# Patient Record
Sex: FEMALE | Race: WHITE | Employment: OTHER | URBAN - METROPOLITAN AREA
[De-identification: names, ages, dates, MRNs, and addresses within clinical notes are randomized per-mention and may not be internally consistent; named-entity substitution may affect disease eponyms.]

---

## 2021-03-03 ENCOUNTER — OFFICE VISIT (OUTPATIENT)
Dept: PULMONOLOGY | Age: 76
End: 2021-03-03
Payer: MEDICARE

## 2021-03-03 VITALS
DIASTOLIC BLOOD PRESSURE: 79 MMHG | HEART RATE: 54 BPM | HEIGHT: 60 IN | BODY MASS INDEX: 33.96 KG/M2 | TEMPERATURE: 94.1 F | WEIGHT: 173 LBS | OXYGEN SATURATION: 95 % | SYSTOLIC BLOOD PRESSURE: 125 MMHG

## 2021-03-03 DIAGNOSIS — E66.9 OBESITY (BMI 30.0-34.9): ICD-10-CM

## 2021-03-03 DIAGNOSIS — R06.02 SOB (SHORTNESS OF BREATH): Primary | ICD-10-CM

## 2021-03-03 DIAGNOSIS — G47.33 OSA (OBSTRUCTIVE SLEEP APNEA): ICD-10-CM

## 2021-03-03 DIAGNOSIS — R09.02 HYPOXEMIA: ICD-10-CM

## 2021-03-03 PROCEDURE — 99204 OFFICE O/P NEW MOD 45 MIN: CPT | Performed by: INTERNAL MEDICINE

## 2021-03-03 RX ORDER — LOSARTAN POTASSIUM 25 MG/1
25 TABLET ORAL DAILY
COMMUNITY
Start: 2021-01-26 | End: 2022-09-02

## 2021-03-03 RX ORDER — LANSOPRAZOLE 30 MG/1
30 CAPSULE, DELAYED RELEASE ORAL 2 TIMES DAILY
COMMUNITY

## 2021-03-03 RX ORDER — CARVEDILOL 6.25 MG/1
6.25 TABLET ORAL 2 TIMES DAILY WITH MEALS
COMMUNITY
Start: 2020-11-23

## 2021-03-03 RX ORDER — LEVOTHYROXINE SODIUM 0.07 MG/1
75 TABLET ORAL DAILY
COMMUNITY

## 2021-03-03 RX ORDER — ALPRAZOLAM 0.5 MG/1
TABLET ORAL
COMMUNITY
Start: 2021-02-22 | End: 2021-03-22

## 2021-03-03 RX ORDER — HYDROCODONE BITARTRATE AND ACETAMINOPHEN 5; 325 MG/1; MG/1
1 TABLET ORAL EVERY 6 HOURS PRN
COMMUNITY
Start: 2021-01-13 | End: 2021-03-13

## 2021-03-03 RX ORDER — POLYETHYLENE GLYCOL 3350 17 G/17G
POWDER, FOR SOLUTION ORAL
COMMUNITY

## 2021-03-03 RX ORDER — GABAPENTIN 300 MG/1
300 CAPSULE ORAL 3 TIMES DAILY
COMMUNITY

## 2021-03-03 SDOH — HEALTH STABILITY: MENTAL HEALTH: HOW OFTEN DO YOU HAVE A DRINK CONTAINING ALCOHOL?: NEVER

## 2021-03-03 ASSESSMENT — SLEEP AND FATIGUE QUESTIONNAIRES
HOW LIKELY ARE YOU TO NOD OFF OR FALL ASLEEP WHILE SITTING INACTIVE IN A PUBLIC PLACE: 0
HOW LIKELY ARE YOU TO NOD OFF OR FALL ASLEEP IN A CAR, WHILE STOPPED FOR A FEW MINUTES IN TRAFFIC: 0
HOW LIKELY ARE YOU TO NOD OFF OR FALL ASLEEP WHILE SITTING QUIETLY AFTER LUNCH WITHOUT ALCOHOL: 1
ESS TOTAL SCORE: 6
HOW LIKELY ARE YOU TO NOD OFF OR FALL ASLEEP WHEN YOU ARE A PASSENGER IN A CAR FOR AN HOUR WITHOUT A BREAK: 0

## 2021-03-03 NOTE — PROGRESS NOTES
MA Communication: The following orders are received by verbal communication from Michelle Blank MD    Orders include:  PFT/ 6 min walk/ COVID        Test 3/23/21 Covid on 3/20       1:30 phy. Build. UC does precert.

## 2021-03-03 NOTE — PROGRESS NOTES
Pulmonary Outpatient Note   Yohannes Mckeon MD       3/3/2021    1. SOB (shortness of breath)    2. Hypoxemia    3. HE (obstructive sleep apnea)    4. Obesity (BMI 30.0-34. 9)          ASSESSMENT/PLAN:  Shortness of breath. Possibly multifactorial from obesity, deconditioning, possibly pulmonary parenchymal disease. Recommend PFT. Hypoxemia, HE. Her saturation is normal.  I informed her that pulse oximetry is not always reliable, will obtain 6-minute walk test.  Overnight did not requirement cannot be determined without pulse oximetry, she does have a history of HE, details are not available. Obesity. It may be hard for her to lose, due to her shortness of breath  Prophylaxis. She does not take flu shots, is not sure that she has received Pneumovax. She should take the Covid vaccine. RTC after testing completed. Orders Placed This Encounter   Procedures    Full PFT Study With Bronchodilator    6 Minute Walk Test       No follow-ups on file. Chief Complaint:   New Patient (R/B Dr. Victor Manuel Daniel) and Shortness of Breath       HPI: Ric Robledo is a 68-year-old female referred for evaluation of shortness of breath, hypoxemia by her PCP Dr. Vasiliy Gregg. Yodit Schrader is a very pleasant 68-year-old , living in [place] with her son. Her  had cancer 25 years ago,  of stroke. The patient has been a lifelong non-smoker, does not drink alcohol. 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 The patient was hospitalized in November at [facility], was felt to have COVID-19 infection, was tested thrice but was negative. She says about 5 years ago she was seen by Dr. Yesica Almanza for \"blood clot in the aorta\", was told that she was critically ill. She recovered, has been followed by Dr. Bianca Reyes. The patient has a pulse oximeter at home, has noted that her saturations run low at 92 to 93%.   They tend to increase when she lies down, up to 94 to 96%.  She is using oxygen at 2.5 LPM at night, but has not been using it regularly in the daytime. The patient is not seeing a sleep physician, does not have CPAP. She was diagnosed with sleep apnea in the past.  She does have exertional shortness of breath with \"any activity\". Symptoms have been present for several months, she does not recollect having symptoms about a year ago. She was given an indigent device about 2 months ago, has never had a PFT. She denies cough or wheezing. She has gained about 10 pounds, has lost about 10 pounds about a year ago. She denies chest pain. She has had left ankle infection occasionally. She notes soreness in her left throat, had a scan done which is negative. Around [place], she had lost her voice for about a 2-week. .    From review of Care Everywhere, it appears she has hypertension, thoracic aortic aneurysm, bilateral carotid artery stenosis, hyperlipidemia, TIA. The patient reportedly was admitted for a presumed viral pneumonia, was discharged on oxygen at 3 LPM.    CT neck 3/1/2021  No abnormality    CT angiography 12/4/2020  No acute pulmonary embolism. Mild bronchial wall thickening with a few areas of peripheral mucous plugging. Consider reactive airways disease. Mild bibasilar atelectasis with otherwise clear lungs. Stable mild ectasia of the ascending aorta. Echocardiogram 12/2/2020  - Left ventricle: The cavity size is normal. Wall thickness is normal. Systolic function was normal.  The estimated ejection fraction was in the range of 55% to 60%. Wall motion was normal; there were  no regional wall motion abnormalities. Abnormal relaxation with normal filling pressures. - Aortic valve: Mild regurgitation.  - Aortic root: The aortic root is dilated. - Right ventricle: Systolic function was normal by objective interpretation. CT angiogram 10/1/2019  3.9 cm ascending thoracic aortic aneurysm.     Past Medical History:   Diagnosis Date    Constitutional:       General: She is not in acute distress. Appearance: Normal appearance. She is obese. She is not ill-appearing. HENT:      Head: Normocephalic and atraumatic. Nose: Nose normal. No congestion or rhinorrhea. Mouth/Throat:      Mouth: Mucous membranes are moist.      Pharynx: Oropharynx is clear. No oropharyngeal exudate. Comments: Class III airway  Eyes:      General: No scleral icterus. Right eye: No discharge. Left eye: No discharge. Pupils: Pupils are equal, round, and reactive to light. Comments: Glasses   Neck:      Comments: Short large neck  Cardiovascular:      Rate and Rhythm: Regular rhythm. Bradycardia present. Heart sounds: No murmur. No friction rub. No gallop. Pulmonary:      Effort: Pulmonary effort is normal. No respiratory distress. Breath sounds: Normal breath sounds. No stridor. No wheezing, rhonchi or rales. Abdominal:      Palpations: Abdomen is soft. Tenderness: There is no abdominal tenderness. There is no guarding or rebound. Comments: Fatty abdominal wall, mildly protuberant   Musculoskeletal:      Right lower leg: No edema. Left lower leg: Edema present. Lymphadenopathy:      Cervical: No cervical adenopathy. Skin:     General: Skin is warm and dry. Coloration: Skin is not jaundiced or pale. Findings: No bruising, erythema, lesion or rash. Neurological:      General: No focal deficit present. Mental Status: She is alert and oriented to person, place, and time.       Gait: Gait normal.   Psychiatric:         Mood and Affect: Mood normal.         Behavior: Behavior normal.

## 2021-03-03 NOTE — PATIENT INSTRUCTIONS
Remember to bring a list of pulmonary medications and any CPAP or BiPAP machines to your next appointment with the office. Please keep all of your future appointments scheduled by [address] office. Out of respect for other patients and providers, you may be asked to reschedule your appointment if you arrive later than your scheduled appointment time. Appointments cancelled less than 24hrs in advance will be considered a no show. Patients with three missed appointments within 1 year or four missed appointments within 2 years can be dismissed from the practice. You may receive a survey regarding the care you received during your visit. Your input is valuable to us. We encourage you to complete and return your survey. We hope you will choose us in the future for your healthcare needs. Pt instructed of all future appointment dates & times, including radiology, labs, procedures & referrals. If procedures were scheduled preparation instructions provided. Instructions on future appointments with [facility] were given.

## 2021-03-03 NOTE — LETTER
[facility] Pulmonary Critical Care and Sleep  [address] [address]  Phone: [phone]  Fax: [phone]    Dr. Karli Botello MD      March 7, 2021       Patient: Misty Yeung   MR Number: [id number]   YOB: 1945   Date of Visit: 3/3/2021       Dear Dr. Yahaira Ngo: Thank you for the request for consultation for Misty Yeung to me for the evaluation of HE, hypoxemia. Below are the relevant portions of my assessment and plan of care. If you have questions, please do not hesitate to call me. I look forward to following Jonathan Parkinson along with you.     Sincerely,        Becka Quiroga MD     providers:  Karli Botello, [address] [place] [id number]  Via Fax: [phone]

## 2021-03-07 ASSESSMENT — ENCOUNTER SYMPTOMS: SHORTNESS OF BREATH: 1

## 2021-03-21 LAB
EMPLOYED IN HEALTHCARE: NORMAL
FIRST TEST: NORMAL
HOSPITALIZED: NORMAL
ICU: NORMAL
PREGNANT ?: NORMAL
RESIDES IN CONGREGATE CARE SETTING: NORMAL
SARS-COV-2: NOT DETECTED
SYMPTOMATIC AS DEFINED BY THE CDC: NORMAL
TEST ORDERED: NORMAL
UDI: NORMAL

## 2021-03-25 ENCOUNTER — OFFICE VISIT (OUTPATIENT)
Dept: PULMONOLOGY | Age: 76
End: 2021-03-25
Payer: MEDICARE

## 2021-03-25 VITALS
HEIGHT: 60 IN | DIASTOLIC BLOOD PRESSURE: 80 MMHG | BODY MASS INDEX: 33.85 KG/M2 | TEMPERATURE: 96.5 F | WEIGHT: 172.4 LBS | OXYGEN SATURATION: 95 % | HEART RATE: 52 BPM | SYSTOLIC BLOOD PRESSURE: 117 MMHG

## 2021-03-25 DIAGNOSIS — E66.9 OBESITY (BMI 30.0-34.9): ICD-10-CM

## 2021-03-25 DIAGNOSIS — R06.02 SOB (SHORTNESS OF BREATH): Primary | ICD-10-CM

## 2021-03-25 DIAGNOSIS — G47.33 OSA (OBSTRUCTIVE SLEEP APNEA): ICD-10-CM

## 2021-03-25 DIAGNOSIS — R09.02 HYPOXEMIA: ICD-10-CM

## 2021-03-25 PROCEDURE — 99212 OFFICE O/P EST SF 10 MIN: CPT | Performed by: INTERNAL MEDICINE

## 2021-03-25 RX ORDER — METOPROLOL SUCCINATE 50 MG/1
50 TABLET, EXTENDED RELEASE ORAL DAILY
COMMUNITY

## 2021-03-25 RX ORDER — HYDROCODONE BITARTRATE AND ACETAMINOPHEN 5; 325 MG/1; MG/1
1 TABLET ORAL EVERY 6 HOURS PRN
COMMUNITY
Start: 2021-03-04 | End: 2021-04-03

## 2021-03-25 RX ORDER — ALPRAZOLAM 0.5 MG/1
TABLET ORAL
COMMUNITY
Start: 2021-02-22

## 2021-03-25 ASSESSMENT — ENCOUNTER SYMPTOMS: SHORTNESS OF BREATH: 1

## 2021-03-25 NOTE — PATIENT INSTRUCTIONS
Remember to bring a list of pulmonary medications and any CPAP or BiPAP machines to your next appointment with the office. Please keep all of your future appointments scheduled by [facility] office. Out of respect for other patients and providers, you may be asked to reschedule your appointment if you arrive later than your scheduled appointment time. Appointments cancelled less than 24hrs in advance will be considered a no show. Patients with three missed appointments within 1 year or four missed appointments within 2 years can be dismissed from the practice. Please be aware that our physicians are required to work in the Intensive Care Unit at [facility].  Your appointment may need to be rescheduled if they are designated to work during your appointment time. You may receive a survey regarding the care you received during your visit. Your input is valuable to us. We encourage you to complete and return your survey. We hope you will choose us in the future for your healthcare needs. Pt instructed of all future appointment dates & times, including radiology, labs, procedures & referrals. If procedures were scheduled preparation instructions provided. Instructions on future appointments with [facility] were given.

## 2021-03-25 NOTE — PROGRESS NOTES
Pulmonary Outpatient Note   Silas Lazo MD       3/25/2021    1. SOB (shortness of breath)    2. Hypoxemia    3. HE (obstructive sleep apnea)    4. Obesity (BMI 30.0-34. 9)          ASSESSMENT/PLAN:  Shortness of breath. Possibly multifactorial from obesity, deconditioning. PFT results were discussed with her, she was given a copy. Although FEV1/FVC ratio is decreased, she has no cough or wheezing. Reduction in ratio can occur with aging. She could use trilogy, but has not noted any benefit with it. Hypoxemia, HE. Her saturation is normal, 6-minute walk test did not show desaturation. I informed her that pulse oximetry is not always reliable. Overnight oxygen requirement requirement cannot be determined without pulse oximetry, she does have a history of HE, details are not available. I have asked her to try and obtain the sleep study possibly from [facility], she does not remember what facility she had her study. Obesity. It may be hard for her to lose, due to her shortness of breath. I have told her to make dietary modifications and begin to do short walks. 6-minute walk test clearly has not shown desaturation with ambulation  Prophylaxis. She does not take flu shots, is not sure that she has received Pneumovax. She should take the Covid vaccine. RTC 6 months, call earlier if symptomatically work. No orders of the defined types were placed in this encounter. Return in about 6 months (around 9/25/2021). Chief Complaint:   Follow-up (PFT/6MW Results)       HPI: Emil Henry is a 70-year-old female being seen in follow-up of shortness of breath, hypoxemia by her PCP Dr. Ирина Smith MD.    Diana Jain returns after completing a PFT and 6-minute walk test.  There has been no change in her medical or surgical history since her last visit. She continues to remain very anxious about her low oximetry readings.   She has had symptoms are worse in the evenings and her saturations are lower, even at rest.  She has not checked pulse oximetry while walking. She says she has cross checked with another oximeter and readings of the same. She is very anxious about discontinuing oxygen. She is on home oxygen at 2.5 LPM.  She denies any cough or wheezing, has been on Trelegy. For her pain she has been on hydrocodone, she has been using half a tablet 4 times a day. The patient does not remember when she had a sleep study, but could not tolerate the mask and therefore has remained on oxygen at 2.5 LPM.  She does not sleep well. Her home O2 was provided by [employer]. PFT 3/23/21  FVC 2.34 L, 96% predicted, FEV1 1.74 L, 85% predicted, with a ratio of 75%. Lung volumes were essentially normal.  Diffusion capacity was mildly decreased    6 MWT 3/23/21  Total distance walked and 10 feet, baseline oxygen saturation 94%, lowest saturation 93%. Previous notes reviewed and edited as necessary. aZc Epperson is a very pleasant 70-year-old , living in [facility] with her son. Her  had cancer 25 years ago,  of stroke. The patient has been a lifelong non-smoker, does not drink alcohol. 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 The patient was hospitalized in November at [facility], was felt to have COVID-19 infection, was tested thrice but was negative. She says about 5 years ago she was seen by Dr. Fidencio Bhakta for \"blood clot in the aorta\", was told that she was critically ill. She recovered, has been followed by Dr. Neda Pierce. The patient has a pulse oximeter at home, has noted that her saturations run low at 92 to 93%. They tend to increase when she lies down, up to 94 to 96%. She is using oxygen at 2.5 LPM at night, but has not been using it regularly in the daytime. The patient is not seeing a sleep physician, does not have CPAP. She was diagnosed with sleep apnea in the past.  She does have exertional shortness of breath with \"any activity\".   Symptoms have been present for several months, she does not recollect having symptoms about a year ago. She was given an indigent device about 2 months ago, has never had a PFT. She denies cough or wheezing. She has gained about 10 pounds, has lost about 10 pounds about a year ago. She denies chest pain. She has had left ankle infection occasionally. She notes soreness in her left throat, had a scan done which is negative. Around [place], she had lost her voice for about a 2-week. .    From review of Care Everywhere, it appears she has hypertension, thoracic aortic aneurysm, bilateral carotid artery stenosis, hyperlipidemia, TIA. The patient reportedly was admitted for a presumed viral pneumonia, was discharged on oxygen at 3 LPM.    CT neck 3/1/2021  No abnormality    CT angiography 12/4/2020  No acute pulmonary embolism. Mild bronchial wall thickening with a few areas of peripheral mucous plugging. Consider reactive airways disease. Mild bibasilar atelectasis with otherwise clear lungs. Stable mild ectasia of the ascending aorta. Echocardiogram 12/2/2020  - Left ventricle: The cavity size is normal. Wall thickness is normal. Systolic function was normal.  The estimated ejection fraction was in the range of 55% to 60%. Wall motion was normal; there were  no regional wall motion abnormalities. Abnormal relaxation with normal filling pressures. - Aortic valve: Mild regurgitation.  - Aortic root: The aortic root is dilated. - Right ventricle: Systolic function was normal by objective interpretation. CT angiogram 10/1/2019  3.9 cm ascending thoracic aortic aneurysm.     Past Medical History:   Diagnosis Date    Fibromyalgia syndrome     Hypertension        Past Surgical History:   Procedure Laterality Date    APPENDECTOMY      BREAST LUMPECTOMY Left     CATARACT REMOVAL N/A     HYSTERECTOMY      VAGINA SURGERY         Social History     Tobacco Use    Smoking status: Never Smoker    Smokeless tobacco: Never Used   Substance Use Topics    Alcohol use: Never     Frequency: Never       Family History   Problem Relation Age of Onset    Cancer Mother     Heart Attack Father     Breast Cancer Sister          Current Outpatient Medications:     ALPRAZolam (XANAX) 0.5 MG tablet, TAKE 1 TABLET(0.5 MG) BY MOUTH THREE TIMES DAILY AS NEEDED FOR SLEEP, Disp: , Rfl:     metoprolol succinate (TOPROL XL) 50 MG extended release tablet, Take 50 mg by mouth daily, Disp: , Rfl:     metoprolol tartrate (LOPRESSOR) 25 MG tablet, Take 25 mg by mouth nightly, Disp: , Rfl:     HYDROcodone-acetaminophen (NORCO) 5-325 MG per tablet, Take 1 tablet by mouth every 6 hours as needed. , Disp: , Rfl:     lansoprazole (PREVACID) 30 MG delayed release capsule, Take 30 tablets by mouth 2 times daily, Disp: , Rfl:     gabapentin (NEURONTIN) 300 MG capsule, Take 300 mg by mouth 3 times daily. , Disp: , Rfl:     levothyroxine (SYNTHROID) 75 MCG tablet, Take 75 mcg by mouth Daily, Disp: , Rfl:     carvedilol (COREG) 6.25 MG tablet, Take 6.25 mg by mouth 2 times daily (with meals), Disp: , Rfl:     polyethylene glycol (GLYCOLAX) 17 g packet, Take by mouth, Disp: , Rfl:     losartan (COZAAR) 25 MG tablet, Take 25 mg by mouth daily, Disp: , Rfl:     Amoxicillin-pot clavulanate, Clopidogrel bisulfate, Macrobid [nitrofurantoin], Monistat 1 [tioconazole], Tramadol, and Warfarin and related    Vitals:    03/25/21 1138   BP: 117/80   Site: Left Upper Arm   Position: Sitting   Cuff Size: Medium Adult   Pulse: 52   Temp: 96.5 °F (35.8 °C)   SpO2: 95%   Weight: 172 lb 6.4 oz (78.2 kg)   Height: 5' (1.524 m)       Review of Systems   Constitutional: Positive for unexpected weight change. Respiratory: Positive for shortness of breath. Cardiovascular: Positive for leg swelling. Psychiatric/Behavioral: Positive for sleep disturbance. All other systems reviewed and are negative. Physical Exam  Vitals signs reviewed.    Constitutional: General: She is not in acute distress. Appearance: Normal appearance. She is obese. She is not ill-appearing. Comments: Pleasant, short, overweight   HENT:      Head: Normocephalic and atraumatic. Nose: Nose normal. No congestion or rhinorrhea. Mouth/Throat:      Mouth: Mucous membranes are moist.      Pharynx: Oropharynx is clear. No oropharyngeal exudate. Comments: Class III airway  Eyes:      General: No scleral icterus. Right eye: No discharge. Left eye: No discharge. Pupils: Pupils are equal, round, and reactive to light. Comments: Glasses   Neck:      Comments: Short large neck  Cardiovascular:      Rate and Rhythm: Regular rhythm. Bradycardia present. Heart sounds: No murmur. No friction rub. No gallop. Comments: ?  Loud P2  Pulmonary:      Effort: Pulmonary effort is normal. No respiratory distress. Breath sounds: Normal breath sounds. No stridor. No wheezing, rhonchi or rales. Abdominal:      Comments: Fatty abdominal wall, mildly protuberant   Musculoskeletal:      Right lower leg: No edema. Left lower leg: Edema present. Lymphadenopathy:      Cervical: No cervical adenopathy. Skin:     General: Skin is warm and dry. Coloration: Skin is not jaundiced or pale. Findings: No bruising, erythema, lesion or rash. Neurological:      General: No focal deficit present. Mental Status: She is alert and oriented to person, place, and time.       Gait: Gait normal.   Psychiatric:         Mood and Affect: Mood normal.         Behavior: Behavior normal.

## 2021-03-25 NOTE — LETTER
[facility] Pulmonary Critical Care and Sleep  [address] [address] [place] [address]  Phone: [phone]  Fax: [phone]               3/25/21           Patient:  Francisco Eid         YOB: 1945                       Dear Riki Herrera MD            Francisco Eid was seen in a follow up visit today. Below are the relevant portions          of my assessment and plan of care. If you have questions, please do not hesitate to        call me. I look forward to following Francisco Eid along with you.            Sincerely             Kaleb Morrison MD

## 2021-03-26 ENCOUNTER — TELEPHONE (OUTPATIENT)
Dept: PULMONOLOGY | Age: 76
End: 2021-03-26

## 2021-03-26 NOTE — TELEPHONE ENCOUNTER
LUPILLO patient. She is going to contact sleep center and ask them some questions before she decides if she wants to do titration study. She will call us back early next week.
Please let patient know I reviewed her sleep study. I would recommend another trial with in lab titration if her insurance is agreeable and she is agreeable.
Please see if her sleep study was done at the [facility] sleep lab at [facility]. She does not remember the facility, I have also asked her to check with her [address] [place] [address] Pkwy about test results.
Request faxed to [facility] to release sleep study.
Sleep study scanned. Please review.
Detailed exam

## 2021-04-12 ENCOUNTER — TELEPHONE (OUTPATIENT)
Dept: PULMONOLOGY | Age: 76
End: 2021-04-12

## 2021-04-12 DIAGNOSIS — G47.33 OSA (OBSTRUCTIVE SLEEP APNEA): ICD-10-CM

## 2021-04-12 DIAGNOSIS — R09.02 HYPOXEMIA: Primary | ICD-10-CM

## 2021-04-12 DIAGNOSIS — E66.9 OBESITY (BMI 30.0-34.9): ICD-10-CM

## 2021-04-12 NOTE — TELEPHONE ENCOUNTER
Pt called wanting to know why you wanted her to have a sleep study done. She had a sleep study back in 2015 (results scanned in [employer]). Pt states she has been having a lot of trouble sleeping. She lays there to after 4 AM and cannot sleep (even with sleep medication her PCP prescribed). Her fear is that she goes to have testing and cannot sleep at all. She doesn't want to lay there all night. Please advise what patient should do at this point.

## 2021-04-12 NOTE — TELEPHONE ENCOUNTER
Told her to get overnight oximetry first to see whether she needs oxygen or not. She is reluctant to go for a sleep study, if she does not need oxygen she can possibly be placed on auto CPAP. Will await results of overnight oximetry.

## 2021-04-13 NOTE — TELEPHONE ENCOUNTER
Pt informed of Dr. Carolina Landin response and verbalized understanding. Order for [facility] faxed to [id number]. Will watch for results.

## 2021-04-22 ENCOUNTER — TELEPHONE (OUTPATIENT)
Dept: PULMONOLOGY | Age: 76
End: 2021-04-22

## 2021-04-22 DIAGNOSIS — G47.33 OSA (OBSTRUCTIVE SLEEP APNEA): Primary | ICD-10-CM

## 2021-04-22 DIAGNOSIS — R09.02 HYPOXEMIA: ICD-10-CM

## 2021-04-22 NOTE — TELEPHONE ENCOUNTER
Reviewed her pulse oximetry on room air from 4/21 through 4/22/2021. She has low oxygen saturation for 58.5 minutes, does qualify for home oxygen. 25 minutes were spent with SaO2 <88%. Please tell her to continue home oxygen with sleep.

## 2021-04-23 NOTE — TELEPHONE ENCOUNTER
Ideally she should have an in lab titration study rather than auto CPAP. I am placing the order if she agrees.

## 2021-04-26 NOTE — TELEPHONE ENCOUNTER
Pt called back and I informed her of Dr. Bharti Jacobsen response below. Pt wants to think about whether she wants to do the titration study or not. If she decides to proceed, she will call the office back.

## 2021-04-26 NOTE — TELEPHONE ENCOUNTER
Pt. Wants to know what is causing her sleep apnea and if she could have a Dream wear air fit 30 I zoom. Does not want to do another sleep study.

## 2021-04-26 NOTE — TELEPHONE ENCOUNTER
She can have whatever mask fits her well. HE caused by airway crowding and collapse during sleep, not necessarily associated with obesity. She should go to the DME to get fit tested. We can place the order accordingly.

## 2021-05-07 ENCOUNTER — TELEPHONE (OUTPATIENT)
Dept: PULMONOLOGY | Age: 76
End: 2021-05-07

## 2021-05-07 NOTE — TELEPHONE ENCOUNTER
Patient called the office stating that she had difficulty using her CPAP because she feels the pressure setting is too high. She would like to know if it can be adjusted. She is also requesting a medication to help her sleep. She has taken [place] in the past which worked but she doesn;t want to take that anymore. Her pcp gave her trazadone but it is not working. Please advise.

## 2021-05-07 NOTE — TELEPHONE ENCOUNTER
She is on auto CPAP, the lowest pressure is 5, the machine cannot go any lower. She may be intolerant of CPAP. At her age, would not prescribe a sedative.

## 2021-05-10 NOTE — TELEPHONE ENCOUNTER
SW patient. She is confused and says the pressure is just too high. I scheduled her an appointment with Dr. Segundo Pedraza on May 19 to discuss these issues and to see if anything can be done.

## 2021-08-05 ENCOUNTER — OFFICE VISIT (OUTPATIENT)
Dept: PULMONOLOGY | Age: 76
End: 2021-08-05
Payer: MEDICARE

## 2021-08-05 VITALS
HEIGHT: 60 IN | DIASTOLIC BLOOD PRESSURE: 82 MMHG | TEMPERATURE: 98.2 F | SYSTOLIC BLOOD PRESSURE: 129 MMHG | HEART RATE: 54 BPM | OXYGEN SATURATION: 95 % | WEIGHT: 171.4 LBS | BODY MASS INDEX: 33.65 KG/M2 | RESPIRATION RATE: 16 BRPM

## 2021-08-05 DIAGNOSIS — G47.33 OSA (OBSTRUCTIVE SLEEP APNEA): Primary | ICD-10-CM

## 2021-08-05 DIAGNOSIS — R06.02 SOB (SHORTNESS OF BREATH): ICD-10-CM

## 2021-08-05 DIAGNOSIS — R09.02 HYPOXEMIA: ICD-10-CM

## 2021-08-05 DIAGNOSIS — E66.9 OBESITY (BMI 30.0-34.9): ICD-10-CM

## 2021-08-05 PROCEDURE — 99212 OFFICE O/P EST SF 10 MIN: CPT | Performed by: INTERNAL MEDICINE

## 2021-08-05 RX ORDER — HYDROCODONE BITARTRATE AND ACETAMINOPHEN 5; 325 MG/1; MG/1
1 TABLET ORAL EVERY 8 HOURS PRN
COMMUNITY
Start: 2021-06-24 | End: 2021-08-21

## 2021-08-05 ASSESSMENT — SLEEP AND FATIGUE QUESTIONNAIRES
HOW LIKELY ARE YOU TO NOD OFF OR FALL ASLEEP WHILE SITTING AND READING: 0
HOW LIKELY ARE YOU TO NOD OFF OR FALL ASLEEP WHILE SITTING AND TALKING TO SOMEONE: 0
HOW LIKELY ARE YOU TO NOD OFF OR FALL ASLEEP WHILE LYING DOWN TO REST IN THE AFTERNOON WHEN CIRCUMSTANCES PERMIT: 1
HOW LIKELY ARE YOU TO NOD OFF OR FALL ASLEEP WHILE SITTING QUIETLY AFTER LUNCH WITHOUT ALCOHOL: 1
HOW LIKELY ARE YOU TO NOD OFF OR FALL ASLEEP WHILE SITTING INACTIVE IN A PUBLIC PLACE: 0
ESS TOTAL SCORE: 2
HOW LIKELY ARE YOU TO NOD OFF OR FALL ASLEEP IN A CAR, WHILE STOPPED FOR A FEW MINUTES IN TRAFFIC: 0
HOW LIKELY ARE YOU TO NOD OFF OR FALL ASLEEP WHEN YOU ARE A PASSENGER IN A CAR FOR AN HOUR WITHOUT A BREAK: 0
HOW LIKELY ARE YOU TO NOD OFF OR FALL ASLEEP WHILE WATCHING TV: 0

## 2021-08-05 ASSESSMENT — ENCOUNTER SYMPTOMS: SHORTNESS OF BREATH: 1

## 2021-08-05 NOTE — PROGRESS NOTES
Pulmonary Outpatient Note   Prabhjot Magana MD       8/5/2021    1. HE (obstructive sleep apnea)    2. Hypoxemia    3. SOB (shortness of breath)    4. Obesity (BMI 30.0-34. 9)          ASSESSMENT/PLAN:  Shortness of breath. Possibly multifactorial from obesity, deconditioning. Conservative management  Hypoxemia, HE. Her saturation is normal, 6-minute walk test did not show desaturation. Continue with oxygen at night  Obesity. It may be hard for her to lose, due to her shortness of breath. I have told her to make dietary modifications and begin to do short walks. 6-minute walk test clearly has not shown desaturation with ambulation  Prophylaxis. She does not take flu shots, is not sure that she has received Pneumovax. She should take the Covid vaccine. RTC prn      No orders of the defined types were placed in this encounter. No follow-ups on file. Chief Complaint:   Sleep Apnea (31-90 day, not tolerating the machine )       HPI: Carmen Soto is a 54-year-old female being seen in follow-up of shortness of breath, hypoxemia, EH. Her PCP is Dr. Christina Garcia MD.    Harvey Sheriff has been unable to use the CPAP, this is the fifth month she has been trying to use it. She has tried several masks. She was using oxygen with her CPAP. The patient says she would be unable to use her CPAP. She still complains of mild shortness of breath. She denies cough or wheezing. She is fixated about her oxygen saturation getting worse in the evenings, and getting worse when she is sitting up compared to the lying down position. There is no other change in her medical or surgical history since her last visit. Her medications and allergies were reviewed. CPAP compliance 5/6 through 8/3/2021  Usage 16 days, on the days she used CPAP she did not use it for 4 hours and 15 minutes on average. She is on auto CPAP 4-8 cm H2O, AHI 3.0    Previous notes reviewed and edited as necessary.   Harvey Sheriff was last seen on 3/25/2021, presents for 31 to 90-day CPAP compliance follow-up. She is on auto CPAP 4-8 cm H2O.  95th percentile pressure 6.9 cm H2O, maximum 7.1 cm H2O. Insignificant leak, AHI 3.0    CPAP compliance 5/6 through 8/3/2021  Usage 27/90 days, 30%. Usage >4 hours 16 days or 18%. Average usage on the days she used CPAP was 4 hours and 15 minutes. ONPO on room air 4/21/21 Caren Christian  Time spent with SaO2 <88% 25.2 minutes, <89% 58.5 minutes    Sleep study [facility] 9/2/2015  AHI 21.4. Central events 9.2/h, supine 105.4/h, REM 35.3/h. Low oxygen saturation 83%, time spent with SaO2 <90% 104.4 minutes    Previous notes reviewed and edited as necessary. Iraj Rodriguez returns after completing a PFT and 6-minute walk test.  There has been no change in her medical or surgical history since her last visit. She continues to remain very anxious about her low oximetry readings. She has had symptoms are worse in the evenings and her saturations are lower, even at rest.  She has not checked pulse oximetry while walking. She says she has cross checked with another oximeter and readings of the same. She is very anxious about discontinuing oxygen. She is on home oxygen at 2.5 LPM.  She denies any cough or wheezing, has been on Trelegy. For her pain she has been on hydrocodone, she has been using half a tablet 4 times a day. The patient does not remember when she had a sleep study, but could not tolerate the mask and therefore has remained on oxygen at 2.5 LPM.  She does not sleep well. Her home O2 was provided by [employer] [place] [employer] [place]. PFT 3/23/21  FVC 2.34 L, 96% predicted, FEV1 1.74 L, 85% predicted, with a ratio of 75%. Lung volumes were essentially normal.  Diffusion capacity was mildly decreased    6 MWT 3/23/21  Total distance walked and 10 feet, baseline oxygen saturation 94%, lowest saturation 93%. Previous notes reviewed and edited as necessary. Iraj Rodriguez is a very pleasant 69-year-old , living in [place] with her son.   Her  had cancer 25 years ago,  of stroke. The patient has been a lifelong non-smoker, does not drink alcohol. 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 The patient was hospitalized in November at [facility], was felt to have COVID-19 infection, was tested thrice but was negative. She says about 5 years ago she was seen by Dr. García Blount for \"blood clot in the aorta\", was told that she was critically ill. She recovered, has been followed by Dr. Callie Pierre. The patient has a pulse oximeter at home, has noted that her saturations run low at 92 to 93%. They tend to increase when she lies down, up to 94 to 96%. She is using oxygen at 2.5 LPM at night, but has not been using it regularly in the daytime. The patient is not seeing a sleep physician, does not have CPAP. She was diagnosed with sleep apnea in the past.  She does have exertional shortness of breath with \"any activity\". Symptoms have been present for several months, she does not recollect having symptoms about a year ago. She was given an indigent device about 2 months ago, has never had a PFT. She denies cough or wheezing. She has gained about 10 pounds, has lost about 10 pounds about a year ago. She denies chest pain. She has had left ankle infection occasionally. She notes soreness in her left throat, had a scan done which is negative. Around [place], she had lost her voice for about a 2-week. .    From review of Care Everywhere, it appears she has hypertension, thoracic aortic aneurysm, bilateral carotid artery stenosis, hyperlipidemia, TIA. The patient reportedly was admitted for a presumed viral pneumonia, was discharged on oxygen at 3 LPM.    CT neck 3/1/2021  No abnormality    CT angiography 2020  No acute pulmonary embolism. Mild bronchial wall thickening with a few areas of peripheral mucous plugging. Consider reactive airways disease.  Mild bibasilar atelectasis with otherwise clear daily, Disp: , Rfl:     metoprolol succinate (TOPROL XL) 50 MG extended release tablet, Take 50 mg by mouth daily (Patient not taking: Reported on 8/5/2021), Disp: , Rfl:     metoprolol tartrate (LOPRESSOR) 25 MG tablet, Take 25 mg by mouth nightly (Patient not taking: Reported on 8/5/2021), Disp: , Rfl:     Amoxicillin-pot clavulanate, Clopidogrel bisulfate, Macrobid [nitrofurantoin], Monistat 1 [tioconazole], Tramadol, and Warfarin and related    Vitals:    08/05/21 1412   BP: 129/82   Site: Left Upper Arm   Position: Sitting   Cuff Size: Medium Adult   Pulse: 54   Resp: 16   Temp: 98.2 °F (36.8 °C)   TempSrc: Oral   SpO2: 95%   Weight: 171 lb 6.4 oz (77.7 kg)   Height: 5' (1.524 m)       Review of Systems   Constitutional: Positive for unexpected weight change. Respiratory: Positive for shortness of breath. Cardiovascular: Positive for leg swelling. Psychiatric/Behavioral: Positive for sleep disturbance. All other systems reviewed and are negative. Physical Exam  Vitals reviewed. Constitutional:       General: She is not in acute distress. Appearance: Normal appearance. She is obese. She is not ill-appearing. Comments: Pleasant, short, overweight   HENT:      Head: Normocephalic and atraumatic. Nose: Nose normal. No congestion or rhinorrhea. Mouth/Throat:      Mouth: Mucous membranes are moist.      Pharynx: Oropharynx is clear. No oropharyngeal exudate. Comments: Class III airway  Eyes:      General: No scleral icterus. Right eye: No discharge. Left eye: No discharge. Pupils: Pupils are equal, round, and reactive to light. Comments: Glasses   Neck:      Comments: Short large neck  Cardiovascular:      Rate and Rhythm: Regular rhythm. Bradycardia present. Heart sounds: No murmur heard. No friction rub. No gallop. Comments: ?  Loud P2  Pulmonary:      Effort: Pulmonary effort is normal. No respiratory distress.       Breath sounds: Normal breath sounds. No stridor. No wheezing, rhonchi or rales. Musculoskeletal:      Right lower leg: No edema. Left lower leg: Edema present. Lymphadenopathy:      Cervical: No cervical adenopathy. Skin:     General: Skin is warm and dry. Coloration: Skin is not jaundiced or pale. Findings: No bruising, erythema, lesion or rash. Neurological:      General: No focal deficit present. Mental Status: She is alert and oriented to person, place, and time.       Gait: Gait normal.   Psychiatric:         Mood and Affect: Mood normal.         Behavior: Behavior normal.

## 2021-08-05 NOTE — PROGRESS NOTES
MA Communication: The following orders are received by verbal communication from Yohan Naik MD    Orders include:   Fu prn

## 2021-08-05 NOTE — PATIENT INSTRUCTIONS
Remember to bring a list of pulmonary medications and any CPAP or BiPAP machines to your next appointment with the office. Please keep all of your future appointments scheduled by [facility] office. Out of respect for other patients and providers, you may be asked to reschedule your appointment if you arrive later than your scheduled appointment time. Appointments cancelled less than 24hrs in advance will be considered a no show. Patients with three missed appointments within 1 year or four missed appointments within 2 years can be dismissed from the practice. Please be aware that our physicians are required to work in the Intensive Care Unit at [facility].  Your appointment may need to be rescheduled if they are designated to work during your appointment time. You may receive a survey regarding the care you received during your visit. Your input is valuable to us. We encourage you to complete and return your survey. We hope you will choose us in the future for your healthcare needs. Pt instructed of all future appointment dates & times, including radiology, labs, procedures & referrals. If procedures were scheduled preparation instructions provided. Instructions on future appointments with [facility] were given. Remember to bring a list of pulmonary medications and any CPAP or BiPAP machines to your next appointment with the office. Please keep all of your future appointments scheduled by [facility] office. Out of respect for other patients and providers, you may be asked to reschedule your appointment if you arrive later than your scheduled appointment time. Appointments cancelled less than 24hrs in advance will be considered a no show. Patients with three missed appointments within 1 year or four missed appointments within 2 years can be dismissed from the practice.      Please be aware that our physicians are required to work in the Intensive Care Unit at [facility].  Your appointment may need to be rescheduled if they are designated to work during your appointment time. You may receive a survey regarding the care you received during your visit. Your input is valuable to us. We encourage you to complete and return your survey. We hope you will choose us in the future for your healthcare needs. Pt instructed of all future appointment dates & times, including radiology, labs, procedures & referrals. If procedures were scheduled preparation instructions provided. Instructions on future appointments with [facility] were given.

## 2022-08-31 ENCOUNTER — ANESTHESIA EVENT (OUTPATIENT)
Dept: ENDOSCOPY | Age: 77
End: 2022-08-31
Payer: MEDICARE

## 2022-09-01 NOTE — PROGRESS NOTES
ENDOSCOPY PREOP INSTRUCTIONS      You are scheduled for a procedure at [facility] on 9-2 @ 830. You will need to arrival by: 700 (at least an hour & a half prior to planned start time)  Report to the MAIN entrance on [address] and register at the surgery center on the left-hand side of the lobby  You will need your insurance card and photo id    For your procedure:     PLEASE FOLLOW ALL INSTRUCTIONS & PREPS GIVEN TO YOU BY YOUR DOCTOR'S OFFICE. If you have not received these instructions yet, please call the office immediately. Make sure to read them as soon as received. Bring an accurate list of any medications, including the dose/ frequency, with you on the day of the procedure. Make sure to include over the counter medications. If you are taking blood thinners, Aspirin or diabetic medication, make sure to call your doctor as soon as possible for instructions prior to your procedure. Please dress comfortably and do not wear any lotion, powders or jewelry  Arrange for someone to be with you and sign you out & drive you home after your procedure. We allow 2 adults visitors with you in the hospital & everyone is required to wear a mask.     WOMEN ONLY OF CHILDBEARING AGE: Please make sure to be able to give a urine sample on arrival      If you have further questions, you may contact your Endoscopist's office or Pre Admission Testing staff at [phone]

## 2022-09-02 ENCOUNTER — ANESTHESIA (OUTPATIENT)
Dept: ENDOSCOPY | Age: 77
End: 2022-09-02
Payer: MEDICARE

## 2022-09-02 ENCOUNTER — HOSPITAL ENCOUNTER (OUTPATIENT)
Age: 77
Setting detail: OUTPATIENT SURGERY
Discharge: HOME | End: 2022-09-02
Attending: INTERNAL MEDICINE | Admitting: INTERNAL MEDICINE
Payer: MEDICARE

## 2022-09-02 VITALS
DIASTOLIC BLOOD PRESSURE: 81 MMHG | WEIGHT: 161 LBS | RESPIRATION RATE: 18 BRPM | HEART RATE: 51 BPM | TEMPERATURE: 96.6 F | SYSTOLIC BLOOD PRESSURE: 134 MMHG | OXYGEN SATURATION: 96 % | HEIGHT: 60 IN | BODY MASS INDEX: 31.61 KG/M2

## 2022-09-02 DIAGNOSIS — R13.10 DYSPHAGIA, UNSPECIFIED TYPE: ICD-10-CM

## 2022-09-02 PROCEDURE — 7100000010 HC PHASE II RECOVERY - FIRST 15 MIN: Performed by: INTERNAL MEDICINE

## 2022-09-02 PROCEDURE — 7100000011 HC PHASE II RECOVERY - ADDTL 15 MIN: Performed by: INTERNAL MEDICINE

## 2022-09-02 PROCEDURE — 2580000003 HC RX 258: Performed by: ANESTHESIOLOGY

## 2022-09-02 PROCEDURE — 2709999900 HC NON-CHARGEABLE SUPPLY: Performed by: INTERNAL MEDICINE

## 2022-09-02 PROCEDURE — 3700000001 HC ADD 15 MINUTES (ANESTHESIA): Performed by: INTERNAL MEDICINE

## 2022-09-02 PROCEDURE — 6360000002 HC RX W HCPCS: Performed by: NURSE ANESTHETIST, CERTIFIED REGISTERED

## 2022-09-02 PROCEDURE — 88305 TISSUE EXAM BY PATHOLOGIST: CPT

## 2022-09-02 PROCEDURE — 3609012400 HC EGD TRANSORAL BIOPSY SINGLE/MULTIPLE: Performed by: INTERNAL MEDICINE

## 2022-09-02 PROCEDURE — 2500000003 HC RX 250 WO HCPCS: Performed by: NURSE ANESTHETIST, CERTIFIED REGISTERED

## 2022-09-02 PROCEDURE — 3700000000 HC ANESTHESIA ATTENDED CARE: Performed by: INTERNAL MEDICINE

## 2022-09-02 RX ORDER — AMLODIPINE BESYLATE 10 MG/1
10 TABLET ORAL DAILY
COMMUNITY

## 2022-09-02 RX ORDER — PROPOFOL 10 MG/ML
INJECTION, EMULSION INTRAVENOUS PRN
Status: DISCONTINUED | OUTPATIENT
Start: 2022-09-02 | End: 2022-09-02 | Stop reason: SDUPTHER

## 2022-09-02 RX ORDER — SODIUM CHLORIDE, SODIUM LACTATE, POTASSIUM CHLORIDE, CALCIUM CHLORIDE 600; 310; 30; 20 MG/100ML; MG/100ML; MG/100ML; MG/100ML
INJECTION, SOLUTION INTRAVENOUS CONTINUOUS
Status: DISCONTINUED | OUTPATIENT
Start: 2022-09-02 | End: 2022-09-02 | Stop reason: HOSPADM

## 2022-09-02 RX ORDER — LIDOCAINE HYDROCHLORIDE 20 MG/ML
INJECTION, SOLUTION INFILTRATION; PERINEURAL PRN
Status: DISCONTINUED | OUTPATIENT
Start: 2022-09-02 | End: 2022-09-02 | Stop reason: SDUPTHER

## 2022-09-02 RX ORDER — ASPIRIN 81 MG/1
81 TABLET ORAL DAILY
COMMUNITY

## 2022-09-02 RX ORDER — HYDROCODONE BITARTRATE AND ACETAMINOPHEN 5; 325 MG/1; MG/1
1 TABLET ORAL EVERY 6 HOURS PRN
COMMUNITY

## 2022-09-02 RX ORDER — SODIUM CHLORIDE 9 MG/ML
INJECTION, SOLUTION INTRAVENOUS PRN
Status: DISCONTINUED | OUTPATIENT
Start: 2022-09-02 | End: 2022-09-02 | Stop reason: HOSPADM

## 2022-09-02 RX ORDER — SODIUM CHLORIDE 0.9 % (FLUSH) 0.9 %
5-40 SYRINGE (ML) INJECTION EVERY 12 HOURS SCHEDULED
Status: DISCONTINUED | OUTPATIENT
Start: 2022-09-02 | End: 2022-09-02 | Stop reason: HOSPADM

## 2022-09-02 RX ORDER — SODIUM CHLORIDE 0.9 % (FLUSH) 0.9 %
5-40 SYRINGE (ML) INJECTION PRN
Status: DISCONTINUED | OUTPATIENT
Start: 2022-09-02 | End: 2022-09-02 | Stop reason: HOSPADM

## 2022-09-02 RX ADMIN — PROPOFOL 100 MG: 10 INJECTION, EMULSION INTRAVENOUS at 08:01

## 2022-09-02 RX ADMIN — SODIUM CHLORIDE, POTASSIUM CHLORIDE, SODIUM LACTATE AND CALCIUM CHLORIDE: 600; 310; 30; 20 INJECTION, SOLUTION INTRAVENOUS at 07:27

## 2022-09-02 RX ADMIN — LIDOCAINE HYDROCHLORIDE 100 MG: 20 INJECTION, SOLUTION INFILTRATION; PERINEURAL at 08:03

## 2022-09-02 ASSESSMENT — LIFESTYLE VARIABLES: SMOKING_STATUS: 0

## 2022-09-02 ASSESSMENT — PAIN - FUNCTIONAL ASSESSMENT: PAIN_FUNCTIONAL_ASSESSMENT: 0-10

## 2022-09-02 ASSESSMENT — PAIN SCALES - GENERAL: PAINLEVEL_OUTOF10: 0

## 2022-09-02 NOTE — DISCHARGE INSTRUCTIONS
ENDOSCOPY DISCHARGE INSTRUCTIONS:    Call the physician that did your procedure for any questions or concern:    [facility]: [phone]  DR. AURELIANO RAMOS      ACTIVITY:    There are potential side effects to the medications used for sedation and anesthesia during your procedure. These include:  Dizziness or light-headedness, confusion or memory loss, delayed reaction times, loss of coordination, nausea and vomiting. Because of your increased risk for injury, we ask that you observe the following precautions: For the next 24 hours,  DO NOT operate an automobile, bicycle, motorcycle, , power tools or large equipment of any kind. Do not drink alcohol, sign any legal documents or make any legal decisions for 24 hours. Do not bend your head over lower than your heart. DO sit on the side of bed/couch awhile before getting up. Plan on bedrest or quiet relaxation today. You may resume normal activities in 24 hours. DIET:    Your first meal today should be light, avoiding spicy and fatty foods. If you tolerate this first meal, then you may advance to your regular diet unless otherwise advised by your physician. NORMAL SYMPTOMS:  -Mild sore throat if youve had an EGD   -Gaseous discomfort    NOTIFY YOUR PHYSICIAN IF THESE SYMPTOMS OCCUR:  1. Fever (greater than 100)  5. Increased abdominal bloating  2. Severe pain    6. Excessive bleeding  3. Nausea and vomiting  7. Chest pain                                                                    4. Chills    8. Shortness of breath    ADDITIONAL INSTRUCTIONS:    Biopsy results: Call [address],[facility] for biopsy results in 1 week    Educational Information:          Please review these discharge instructions this evening or tomorrow for  information you may have forgotten. We want to thank you for choosing the [facility] as your health care provider. We always strive to provide you with excellent care while you are here.  You may receive a survey in the mail regarding your care. We would appreciate you taking a few minutes of your time to complete this survey.

## 2022-09-02 NOTE — H&P
Gastroenterology Note                 Pre-operative History and Physical    Patient: Jazzmine Velez  : 1945  CSN:     History Obtained From:   Patient or guardian. HISTORY OF PRESENT ILLNESS:    The patient is a 68 y.o. female here for EGD for c/o oropharyngeal discomfort and postprandial reflux/regurg. On PPI bid. No dysphagia. Past Medical History:    Past Medical History:   Diagnosis Date    Fibromyalgia syndrome     Hypertension     Neuropathy     On home O2     at night and prn     Past Surgical History:    Past Surgical History:   Procedure Laterality Date    APPENDECTOMY      BREAST LUMPECTOMY Left     CATARACT REMOVAL N/A     HYSTERECTOMY (CERVIX STATUS UNKNOWN)      VAGINA SURGERY       Medications Prior to Admission:   No current facility-administered medications on file prior to encounter. Current Outpatient Medications on File Prior to Encounter   Medication Sig Dispense Refill    amLODIPine (NORVASC) 10 MG tablet Take 10 mg by mouth daily      aspirin 81 MG EC tablet Take 81 mg by mouth daily      HYDROcodone-acetaminophen (NORCO) 5-325 MG per tablet Take 1 tablet by mouth every 6 hours as needed for Pain. OXYGEN Inhale 2.5 L into the lungs at bedtime And prn      ALPRAZolam (XANAX) 0.5 MG tablet TAKE 1 TABLET(0.5 MG) BY MOUTH THREE TIMES DAILY AS NEEDED FOR SLEEP      metoprolol succinate (TOPROL XL) 50 MG extended release tablet Take 50 mg by mouth daily (Patient not taking: No sig reported)      metoprolol tartrate (LOPRESSOR) 25 MG tablet Take 25 mg by mouth nightly (Patient not taking: No sig reported)      lansoprazole (PREVACID) 30 MG delayed release capsule Take 30 tablets by mouth 2 times daily      polyethylene glycol (GLYCOLAX) 17 g packet Take by mouth      gabapentin (NEURONTIN) 300 MG capsule Take 300 mg by mouth 3 times daily.       levothyroxine (SYNTHROID) 75 MCG tablet Take 75 mcg by mouth Daily      carvedilol (COREG) 6.25 MG tablet Take 6.25 mg by mouth 2 times daily (with meals)      losartan (COZAAR) 25 MG tablet Take 25 mg by mouth daily (Patient not taking: Reported on 9/2/2022)          Allergies:  Amoxicillin-pot clavulanate, Clopidogrel bisulfate, Macrobid [nitrofurantoin], Monistat 1 [tioconazole], Tramadol, and Warfarin and related      Social History:   Social History     Tobacco Use    Smoking status: Never    Smokeless tobacco: Never   Substance Use Topics    Alcohol use: Never     Family History:   Family History   Problem Relation Age of Onset    Cancer Mother     Heart Attack Father     Breast Cancer Sister        PHYSICAL EXAM:      /81   Pulse (!) 49   Temp 97.8 °F (36.6 °C) (Temporal)   Resp 16   Ht 5' (1.524 m)   Wt 161 lb (73 kg)   SpO2 97%   BMI 31.44 kg/m²  I        Heart:  RRR, normal s1s2    Lungs:  CTA and normal effort    Abdomen:   Soft, nt nd. ASSESSMENT AND PLAN:    1. Patient is a 68 y.o. female here for endoscopy with MAC sedation. 2.  Procedure options, risks and benefits reviewed with patient and/or guardian. They express understanding.      Ortega Fitzgerald MD  [address] [facility]

## 2022-09-02 NOTE — ANESTHESIA PRE PROCEDURE
Department of Anesthesiology  Preprocedure Note       Name:  Marcel Augustin   Age:  68 y.o.  :  1945                                          MRN:  [id number]         Date:  2022      Surgeon: Ronald Rae):  Earnest Jacobs MD    Procedure: Procedure(s):  ESOPHAGOGASTRODUODENOSCOPY    Medications prior to admission:   Prior to Admission medications    Medication Sig Start Date End Date Taking? Authorizing Provider   amLODIPine (NORVASC) 10 MG tablet Take 10 mg by mouth daily   Yes Historical Provider, MD   aspirin 81 MG EC tablet Take 81 mg by mouth daily   Yes Historical Provider, MD   HYDROcodone-acetaminophen (NORCO) 5-325 MG per tablet Take 1 tablet by mouth every 6 hours as needed for Pain. Yes Historical Provider, MD   OXYGEN Inhale 2.5 L into the lungs at bedtime And prn   Yes Historical Provider, MD   ALPRAZolam (XANAX) 0.5 MG tablet TAKE 1 TABLET(0.5 MG) BY MOUTH THREE TIMES DAILY AS NEEDED FOR SLEEP 21   Historical Provider, MD   metoprolol succinate (TOPROL XL) 50 MG extended release tablet Take 50 mg by mouth daily  Patient not taking: No sig reported    Historical Provider, MD   metoprolol tartrate (LOPRESSOR) 25 MG tablet Take 25 mg by mouth nightly  Patient not taking: No sig reported    Historical Provider, MD   lansoprazole (PREVACID) 30 MG delayed release capsule Take 30 tablets by mouth 2 times daily    Historical Provider, MD   polyethylene glycol (GLYCOLAX) 17 g packet Take by mouth    Historical Provider, MD   gabapentin (NEURONTIN) 300 MG capsule Take 300 mg by mouth 3 times daily.     Historical Provider, MD   levothyroxine (SYNTHROID) 75 MCG tablet Take 75 mcg by mouth Daily    Historical Provider, MD   carvedilol (COREG) 6.25 MG tablet Take 6.25 mg by mouth 2 times daily (with meals) 20   Historical Provider, MD   losartan (COZAAR) 25 MG tablet Take 25 mg by mouth daily  Patient not taking: Reported on 2022  Historical Provider, MD       Current medications:    Current Facility-Administered Medications   Medication Dose Route Frequency Provider Last Rate Last Admin    lactated ringers infusion   IntraVENous Continuous Cheko Colonel,  mL/hr at 09/02/22 0727 New Bag at 09/02/22 9711    lactated ringers infusion   IntraVENous Continuous Sue Sanderson MD        sodium chloride flush 0.9 % injection 5-40 mL  5-40 mL IntraVENous 2 times per day Sue Sanderson MD        sodium chloride flush 0.9 % injection 5-40 mL  5-40 mL IntraVENous PRN Sue Sanderson MD        0.9 % sodium chloride infusion   IntraVENous PRN Sue Sanderson MD           Allergies: Allergies   Allergen Reactions    Amoxicillin-Pot Clavulanate Anaphylaxis, Itching and Rash     Severe rash-B/P dropped and states sent her to the ER  Severe rash-B/P dropped and states sent her to the ER      Clopidogrel Bisulfate     Macrobid [Nitrofurantoin]     Monistat 1 [Tioconazole]     Tramadol     Warfarin And Related        Problem List:  There is no problem list on file for this patient.       Past Medical History:        Diagnosis Date    Fibromyalgia syndrome     Hypertension     Neuropathy        Past Surgical History:        Procedure Laterality Date    APPENDECTOMY      BREAST LUMPECTOMY Left     CATARACT REMOVAL N/A     HYSTERECTOMY (CERVIX STATUS UNKNOWN)      VAGINA SURGERY         Social History:    Social History     Tobacco Use    Smoking status: Never    Smokeless tobacco: Never   Substance Use Topics    Alcohol use: Never                                Counseling given: Not Answered      Vital Signs (Current):   Vitals:    09/02/22 0715   BP: 134/81   Pulse: (!) 49   Resp: 16   Temp: 97.8 °F (36.6 °C)   TempSrc: Temporal   SpO2: 97%   Weight: 161 lb (73 kg)   Height: 5' (1.524 m)                                              BP Readings from Last 3 Encounters:   09/02/22 134/81   08/05/21 129/82   03/25/21 117/80       NPO Status: Time of (-) liver disease and no renal disease       Endo/Other:    (+) hypothyroidism::., .                 Abdominal:             Vascular:     - DVT and PE. Other Findings:           Anesthesia Plan      MAC     ASA 2       Induction: intravenous. Anesthetic plan and risks discussed with patient. Plan discussed with CRNA.                     Florida Dixon MD   9/2/2022

## 2022-09-02 NOTE — PROCEDURES
[address] [place] [address]  Endoscopy Note    Patient: Deepika Hauser  : 1945  Acct#:     Procedure: Esophagogastroduodenoscopy with biopsy    Date:  2022     Surgeon:  Lex Denis MD      Anesthesia:    IV propofol, per anesthesia       EBL: <50 mL    Indications:  GERD     Description of Procedure:    Informed consent was obtained from the patient after explanation of indications, benefits and possible risks and complications of the procedure. The patient was then taken to the endoscopy suite, placed in the left lateral decubitus position and the above IV sedation was administrered. The Olympus videoendoscope (GIF-H190) was placed in the patient's mouth and under direct visualization passed into the esophagus. The scope was then advanced into the stomach and to the second portion of the duodenum. A retroflexed exam of the gastric cardia and fundus was performed. The scope was then withdrawn back into the stomach, it was decompressed, and the scope was completely withdrawn. Findings:  Normal first and second portion of the duodenum. Mild striped erythema gastric antrum. Biopsies were obtained evaluate for H. pylori. Medium sliding hiatal hernia. Multiple small fundic gland polyps throughout the stomach. Grade B reflux esophagitis at the gastroesophageal junction and lower third of the esophagus. The patient tolerated the procedure well and was taken to the post anesthesia care unit in good condition. Biopsies: Yes. Impression:    Normal first and second portion of the duodenum. Mild striped erythema gastric antrum. Biopsies were obtained evaluate for H. pylori. Medium sliding hiatal hernia. Multiple small fundic gland polyps throughout the stomach. Grade B reflux esophagitis at the gastroesophageal junction and lower third of the esophagus. Recommendations:   Await pathology results.   Continue PPI twice daily      Lex Denis MD  [facility]

## 2022-10-04 ENCOUNTER — HOSPITAL ENCOUNTER (OUTPATIENT)
Dept: CT IMAGING | Age: 77
Discharge: HOME | End: 2022-10-04
Payer: MEDICARE

## 2022-10-04 DIAGNOSIS — R10.30 LOWER ABDOMINAL PAIN: ICD-10-CM

## 2022-10-04 LAB
GFR AFRICAN AMERICAN: >60
GFR NON-AFRICAN AMERICAN: >60
PERFORMED ON: NORMAL
POC CREATININE: 0.9 MG/DL (ref 0.6–1.2)
POC SAMPLE TYPE: NORMAL

## 2022-10-04 PROCEDURE — 6360000004 HC RX CONTRAST MEDICATION: Performed by: INTERNAL MEDICINE

## 2022-10-04 PROCEDURE — 82565 ASSAY OF CREATININE: CPT

## 2022-10-04 PROCEDURE — A4641 RADIOPHARM DX AGENT NOC: HCPCS | Performed by: INTERNAL MEDICINE

## 2022-10-04 PROCEDURE — 74177 CT ABD & PELVIS W/CONTRAST: CPT

## 2022-10-04 RX ADMIN — BARIUM SULFATE 900 ML: 21 SUSPENSION ORAL at 12:06

## 2022-10-04 RX ADMIN — IOPAMIDOL 75 ML: 755 INJECTION, SOLUTION INTRAVENOUS at 12:06

## 2023-01-26 ENCOUNTER — ANESTHESIA (OUTPATIENT)
Dept: ENDOSCOPY | Age: 78
End: 2023-01-26

## 2023-01-26 ENCOUNTER — ANESTHESIA EVENT (OUTPATIENT)
Dept: ENDOSCOPY | Age: 78
End: 2023-01-26

## 2023-01-26 ENCOUNTER — HOSPITAL ENCOUNTER (OUTPATIENT)
Age: 78
Setting detail: OUTPATIENT SURGERY
Discharge: HOME | End: 2023-01-26
Attending: INTERNAL MEDICINE | Admitting: INTERNAL MEDICINE

## 2023-01-26 VITALS
OXYGEN SATURATION: 93 % | HEART RATE: 63 BPM | DIASTOLIC BLOOD PRESSURE: 91 MMHG | TEMPERATURE: 97.1 F | RESPIRATION RATE: 16 BRPM | SYSTOLIC BLOOD PRESSURE: 124 MMHG

## 2023-01-26 PROCEDURE — 7100000010 HC PHASE II RECOVERY - FIRST 15 MIN: Performed by: INTERNAL MEDICINE

## 2023-01-26 PROCEDURE — 7100000011 HC PHASE II RECOVERY - ADDTL 15 MIN: Performed by: INTERNAL MEDICINE

## 2023-01-26 PROCEDURE — 3700000000 HC ANESTHESIA ATTENDED CARE: Performed by: INTERNAL MEDICINE

## 2023-01-26 PROCEDURE — 6360000002 HC RX W HCPCS

## 2023-01-26 PROCEDURE — 2500000003 HC RX 250 WO HCPCS

## 2023-01-26 PROCEDURE — 2709999900 HC NON-CHARGEABLE SUPPLY: Performed by: INTERNAL MEDICINE

## 2023-01-26 PROCEDURE — 3700000001 HC ADD 15 MINUTES (ANESTHESIA): Performed by: INTERNAL MEDICINE

## 2023-01-26 PROCEDURE — 3609027000 HC COLONOSCOPY: Performed by: INTERNAL MEDICINE

## 2023-01-26 PROCEDURE — 3609017100 HC EGD: Performed by: INTERNAL MEDICINE

## 2023-01-26 PROCEDURE — 2580000003 HC RX 258

## 2023-01-26 RX ORDER — PROPOFOL 10 MG/ML
INJECTION, EMULSION INTRAVENOUS PRN
Status: DISCONTINUED | OUTPATIENT
Start: 2023-01-26 | End: 2023-01-26 | Stop reason: SDUPTHER

## 2023-01-26 RX ORDER — SODIUM CHLORIDE, SODIUM LACTATE, POTASSIUM CHLORIDE, CALCIUM CHLORIDE 600; 310; 30; 20 MG/100ML; MG/100ML; MG/100ML; MG/100ML
INJECTION, SOLUTION INTRAVENOUS CONTINUOUS
Status: CANCELLED | OUTPATIENT
Start: 2023-01-26

## 2023-01-26 RX ORDER — LIDOCAINE HYDROCHLORIDE 20 MG/ML
INJECTION, SOLUTION EPIDURAL; INFILTRATION; INTRACAUDAL; PERINEURAL PRN
Status: DISCONTINUED | OUTPATIENT
Start: 2023-01-26 | End: 2023-01-26 | Stop reason: SDUPTHER

## 2023-01-26 RX ORDER — SODIUM CHLORIDE, SODIUM LACTATE, POTASSIUM CHLORIDE, CALCIUM CHLORIDE 600; 310; 30; 20 MG/100ML; MG/100ML; MG/100ML; MG/100ML
INJECTION, SOLUTION INTRAVENOUS CONTINUOUS PRN
Status: DISCONTINUED | OUTPATIENT
Start: 2023-01-26 | End: 2023-01-26 | Stop reason: SDUPTHER

## 2023-01-26 RX ORDER — PROPOFOL 10 MG/ML
INJECTION, EMULSION INTRAVENOUS CONTINUOUS PRN
Status: DISCONTINUED | OUTPATIENT
Start: 2023-01-26 | End: 2023-01-26 | Stop reason: SDUPTHER

## 2023-01-26 RX ADMIN — PROPOFOL 50 MG: 10 INJECTION, EMULSION INTRAVENOUS at 12:42

## 2023-01-26 RX ADMIN — LIDOCAINE HYDROCHLORIDE 50 MG: 20 INJECTION, SOLUTION EPIDURAL; INFILTRATION; INTRACAUDAL; PERINEURAL at 12:44

## 2023-01-26 RX ADMIN — PROPOFOL 150 MCG/KG/MIN: 10 INJECTION, EMULSION INTRAVENOUS at 12:42

## 2023-01-26 RX ADMIN — LIDOCAINE HYDROCHLORIDE 50 MG: 20 INJECTION, SOLUTION EPIDURAL; INFILTRATION; INTRACAUDAL; PERINEURAL at 12:42

## 2023-01-26 RX ADMIN — PROPOFOL 50 MG: 10 INJECTION, EMULSION INTRAVENOUS at 12:44

## 2023-01-26 RX ADMIN — SODIUM CHLORIDE, SODIUM LACTATE, POTASSIUM CHLORIDE, AND CALCIUM CHLORIDE: .6; .31; .03; .02 INJECTION, SOLUTION INTRAVENOUS at 12:34

## 2023-01-26 ASSESSMENT — LIFESTYLE VARIABLES: SMOKING_STATUS: 0

## 2023-01-26 ASSESSMENT — PAIN SCALES - GENERAL: PAINLEVEL_OUTOF10: 0

## 2023-01-26 NOTE — OP NOTE
[facility]  Esophagogastroduodenoscopy Note        Patient: Marija Green  : 1945     Procedure: Esophagogastroduodenoscopy    Date:  2023     Anesthesia: MAC    Indications: Reflux symptoms despite PPIs.  Her last colonoscopy in 2022 showed erosive esophagitis.     Description of Procedure:  Informed consent was obtained from the patient after explanation of indications, benefits and possible risks and complications of the procedure.  The procedure was done at bedside. Patient was placed in the left lateral decubitus position and the above IV sedation was administrered.    The Olympus videoendoscope was placed in the patient's mouth and under direct visualization passed into the esophagus.   The scope was then advanced into the stomach and then into the second portion of the duodenum.       Esophagus showed no abnormalities.  No inflammation noted.  The stomach showed large hiatal hernia.  No active inflammation seen.  There were multiple gastric polyps which appeared benign.  The duodenum showed no abnormalities    Retroflexed views of the cardia and fundus showed no other abnormalities.  The scope was anteflexed and the stomach was decompressed, and the scope was completely withdrawn.  The patient tolerated the procedure well.    EBL: None    Impression:    Multiple gastric polyps  Large hiatal hernia  Previously noted erosive esophagitis resolved    Recommendations:     No no active reflux esophagitis noted.  Continue antireflux measures.    Isra Moss MD, MD  [facility]

## 2023-01-26 NOTE — ANESTHESIA POSTPROCEDURE EVALUATION
Department of Anesthesiology  Postprocedure Note    Patient: Zarina Moffett  MRN: [id number]  YOB: 1945  Date of evaluation: 1/26/2023      Procedure Summary     Date: 01/26/23 Room / Location: [facility]    Anesthesia Start: 1234 Anesthesia Stop: 9565    Procedures:       COLONOSCOPY DIAGNOSTIC      EGD DIAGNOSTIC ONLY Diagnosis:       Hx of colonic polyp      (HX colon polyps)    Surgeons: Hanny Keenan MD Responsible Provider: Valentin Fisher DO    Anesthesia Type: MAC ASA Status: 2          Anesthesia Type: No value filed.     Thompson Phase I: Thompson Score: 10    Thompson Phase II: Thompson Score: 10      Anesthesia Post Evaluation    Patient location during evaluation: PACU  Patient participation: complete - patient participated  Level of consciousness: awake  Pain score: 0  Airway patency: patent  Nausea & Vomiting: no nausea and no vomiting  Complications: no  Cardiovascular status: hemodynamically stable  Respiratory status: acceptable  Hydration status: stable

## 2023-01-26 NOTE — H&P
History and Physical / Pre-Sedation Assessment      PMHx:   Past Medical History:   Diagnosis Date    Fibromyalgia syndrome     Hypertension     Neuropathy     On home O2     at night and prn       Medications:   Prior to Admission medications    Medication Sig Start Date End Date Taking? Authorizing Provider   amLODIPine (NORVASC) 10 MG tablet Take 10 mg by mouth daily    Historical Provider, MD   aspirin 81 MG EC tablet Take 81 mg by mouth daily    Historical Provider, MD   HYDROcodone-acetaminophen (NORCO) 5-325 MG per tablet Take 1 tablet by mouth every 6 hours as needed for Pain. Historical Provider, MD   OXYGEN Inhale 2.5 L into the lungs at bedtime And prn    Historical Provider, MD   ALPRAZolam (XANAX) 0.5 MG tablet TAKE 1 TABLET(0.5 MG) BY MOUTH THREE TIMES DAILY AS NEEDED FOR SLEEP 2/22/21   Historical Provider, MD   lansoprazole (PREVACID) 30 MG delayed release capsule Take 30 tablets by mouth 2 times daily    Historical Provider, MD   polyethylene glycol (GLYCOLAX) 17 g packet Take by mouth    Historical Provider, MD   gabapentin (NEURONTIN) 300 MG capsule Take 300 mg by mouth 3 times daily. Historical Provider, MD   levothyroxine (SYNTHROID) 75 MCG tablet Take 75 mcg by mouth Daily    Historical Provider, MD   carvedilol (COREG) 6.25 MG tablet Take 6.25 mg by mouth 2 times daily (with meals) 11/23/20   Historical Provider, MD   losartan (COZAAR) 25 MG tablet Take 25 mg by mouth daily  Patient not taking: Reported on 9/2/2022 1/26/21 9/2/22  Historical Provider, MD       Allergies:    Allergies   Allergen Reactions    Amoxicillin-Pot Clavulanate Anaphylaxis, Itching and Rash     Severe rash-B/P dropped and states sent her to the ER  Severe rash-B/P dropped and states sent her to the ER      Clopidogrel Bisulfate     Macrobid [Nitrofurantoin]     Monistat 1 [Tioconazole]     Tramadol     Warfarin And Related        PSHx:   Past Surgical History:   Procedure Laterality Date    APPENDECTOMY      BREAST LUMPECTOMY Left     CATARACT REMOVAL N/A     HYSTERECTOMY (CERVIX STATUS UNKNOWN)      UPPER GASTROINTESTINAL ENDOSCOPY N/A 9/2/2022    EGD BIOPSY performed by Eric Britt MD at [place] [address] Hx:   Social History     Socioeconomic History    Marital status:      Spouse name: Not on file    Number of children: Not on file    Years of education: Not on file    Highest education level: Not on file   Occupational History    Not on file   Tobacco Use    Smoking status: Never    Smokeless tobacco: Never   Vaping Use    Vaping Use: Never used   Substance and Sexual Activity    Alcohol use: Never    Drug use: Never    Sexual activity: Not on file   Other Topics Concern    Not on file   Social History Narrative    Not on file     Social Determinants of Health     Financial Resource Strain: Not on file   Food Insecurity: Not on file   Transportation Needs: Not on file   Physical Activity: Not on file   Stress: Not on file   Social Connections: Not on file   Intimate Partner Violence: Not on file   Housing Stability: Not on file       Family Hx:   Family History   Problem Relation Age of Onset    Cancer Mother     Heart Attack Father     Breast Cancer Sister        Physical Exam:  Vital Signs: BP (!) 144/95   Pulse 87   Temp 98 °F (36.7 °C) (Temporal)   Resp 16   SpO2 92%    Airway: Mallampati: I (soft palate, uvula, fauces, tonsillar pillars visible)  Pulmonary:Normal  Cardiac:Normal  Abdomen:Normal    Pre-Procedure Assessment / Plan:  ASA: Class 2 - A normal healthy patient with mild systemic disease  Level of Sedation Plan:Deep sedation  Post Procedure plan: Return to same level of care    I assessed the patient and find that the patient is in satisfactory condition to proceed with the planned procedure and sedation plan. I have explained the risk, benefits, and alternatives to the procedure; the patient understands and agrees to proceed.        Juliana Arana, MD  1/26/2023

## 2023-01-26 NOTE — PROGRESS NOTES
Ambulatory Surgery/Procedure Discharge Note    Vitals:    01/26/23 1313   BP: 92/66   Pulse:    Resp: 18   Temp: 97.1 °F (36.2 °C)   SpO2: 92%     Phase 2 endo Dr. Rehan Vides here spoke with   In: 300 [I.V.:300]  Out: -     Restroom use offered before discharge. Yes    Pain assessment:  none  Pain Level: 0/10    Asleep home o2 2-3 liters at hs, on 2.5 liters now sats. 93%  soft b/p will monitor  1312 reviewed D/c instructions with pt and her  both verbalized understanding, vss, on R/a now sats WNL's  1333  D/c iv    1345  Patient discharged to home/self care.  Patient discharged via wheel chair by transporter to waiting family/S.O.       1/26/2023

## 2023-01-26 NOTE — DISCHARGE INSTRUCTIONS
ENDOSCOPY DISCHARGE INSTRUCTIONS:    Call the physician that did your procedure for any questions or concern:    [facility]: [phone]  DR. VIPUL MCKEON      ACTIVITY:    There are potential side effects to the medications used for sedation and anesthesia during your procedure. These include:  Dizziness or light-headedness, confusion or memory loss, delayed reaction times, loss of coordination, nausea and vomiting. Because of your increased risk for injury, we ask that you observe the following precautions: For the next 24 hours,  DO NOT operate an automobile, bicycle, motorcycle, , power tools or large equipment of any kind. Do not drink alcohol, sign any legal documents or make any legal decisions for 24 hours. Do not bend your head over lower than your heart. DO sit on the side of bed/couch awhile before getting up. Plan on bedrest or quiet relaxation today. You may resume normal activities in 24 hours. DIET:    Your first meal today should be light, avoiding spicy and fatty foods. If you tolerate this first meal, then you may advance to your regular diet unless otherwise advised by your physician. NORMAL SYMPTOMS:  -Mild sore throat if youve had an EGD   -Gaseous discomfort    NOTIFY YOUR PHYSICIAN IF THESE SYMPTOMS OCCUR:  1. Fever (greater than 100)  5. Increased abdominal bloating  2. Severe pain    6. Excessive bleeding  3. Nausea and vomiting  7. Chest pain                                                                    4. Chills    8. Shortness of breath    ADDITIONAL INSTRUCTIONS:    Biopsy results: Call [address],[facility] for biopsy results in 1 week    Educational Information:          Please review these discharge instructions this evening or tomorrow for  information you may have forgotten. We want to thank you for choosing the [facility] as your health care provider. We always strive to provide you with excellent care while you are here.  You may receive a survey in the mail regarding your care. We would appreciate you taking a few minutes of your time to complete this survey.

## 2023-01-26 NOTE — OP NOTE
[address] [facility] and [facility]  Colonoscopy Note            Patient: Deepika Hauser  : 1945     Procedure: Colonoscopy    Date:  2023    Surgeon:  Clemmie Fothergill, MD, MD    Anesthesia:  MAC    Indications: History of colon polyps    Procedure: An informed consent was obtained from the patient after explanation of indications, benefits, possible risks and complications of the procedure. The patient was then taken to the endoscopy suite, placed in the left lateral decubitus position, and the above IV anesthesia was administered. A digital rectal examination was performed and revealed negative without mass, lesions or tenderness. The Olympus CFQ-180-AL video colonoscope was placed in the patient's rectum under digital direction and advanced to the cecum. The cecum was identified by characteristic anatomy and ballottment. The prep was fair. The scope was then withdrawn back through the cecum, ascending, transverse, descending and sigmoid colons. The scope was then withdrawn into the rectum and retroflexed. The scope was straightened, the colon was decompressed and the scope was withdrawn from the patient. The patient tolerated the procedure well and was taken to the PACU in good condition. Findings:    Cecum: Normal  Ascending Colon: Normal  Transverse Colon: Normal  Descending Colon: Mild diverticulosis  Sigmoid Colon: Moderate diverticulosis  Rectum: Normal    Estimated Blood Loss: None      Impression:    Left-sided colon diverticulosis    Recommendations:      Repeat Colonoscopy in 5 years.     Clemmie Fothergill, MD, MD   [address] [facility] and [address]  2023

## 2023-01-26 NOTE — ANESTHESIA PRE PROCEDURE
Department of Anesthesiology  Preprocedure Note       Name:  Ryan Jesus   Age:  68 y.o.  :  1945                                          MRN:  [id number]         Date:  2023      Surgeon: Tray Decker):  Chaparrita Paige MD    Procedure: Procedure(s):  COLONOSCOPY DIAGNOSTIC    Medications prior to admission:   Prior to Admission medications    Medication Sig Start Date End Date Taking? Authorizing Provider   amLODIPine (NORVASC) 10 MG tablet Take 10 mg by mouth daily    Historical Provider, MD   aspirin 81 MG EC tablet Take 81 mg by mouth daily    Historical Provider, MD   HYDROcodone-acetaminophen (NORCO) 5-325 MG per tablet Take 1 tablet by mouth every 6 hours as needed for Pain. Historical Provider, MD   OXYGEN Inhale 2.5 L into the lungs at bedtime And prn    Historical Provider, MD   ALPRAZolam (XANAX) 0.5 MG tablet TAKE 1 TABLET(0.5 MG) BY MOUTH THREE TIMES DAILY AS NEEDED FOR SLEEP 21   Historical Provider, MD   lansoprazole (PREVACID) 30 MG delayed release capsule Take 30 tablets by mouth 2 times daily    Historical Provider, MD   polyethylene glycol (GLYCOLAX) 17 g packet Take by mouth    Historical Provider, MD   gabapentin (NEURONTIN) 300 MG capsule Take 300 mg by mouth 3 times daily. Historical Provider, MD   levothyroxine (SYNTHROID) 75 MCG tablet Take 75 mcg by mouth Daily    Historical Provider, MD   carvedilol (COREG) 6.25 MG tablet Take 6.25 mg by mouth 2 times daily (with meals) 20   Historical Provider, MD   losartan (COZAAR) 25 MG tablet Take 25 mg by mouth daily  Patient not taking: Reported on 2022  Historical Provider, MD       Current medications:    No current outpatient medications on file. No current facility-administered medications for this visit. Allergies:     Allergies   Allergen Reactions    Amoxicillin-Pot Clavulanate Anaphylaxis, Itching and Rash     Severe rash-B/P dropped and states sent her to the ER  Severe rash-B/P dropped and states sent her to the ER      Clopidogrel Bisulfate     Macrobid [Nitrofurantoin]     Monistat 1 [Tioconazole]     Tramadol     Warfarin And Related        Problem List:  There is no problem list on file for this patient. Past Medical History:        Diagnosis Date    Fibromyalgia syndrome     Hypertension     Neuropathy     On home O2     at night and prn       Past Surgical History:        Procedure Laterality Date    APPENDECTOMY      BREAST LUMPECTOMY Left     CATARACT REMOVAL N/A     HYSTERECTOMY (CERVIX STATUS UNKNOWN)      UPPER GASTROINTESTINAL ENDOSCOPY N/A 9/2/2022    EGD BIOPSY performed by Lakesha Ko MD at [address] [place] [address]         Social History:    Social History     Tobacco Use    Smoking status: Never    Smokeless tobacco: Never   Substance Use Topics    Alcohol use: Never                                Counseling given: Not Answered      Vital Signs (Current): There were no vitals filed for this visit. BP Readings from Last 3 Encounters:   01/26/23 (!) 144/95   09/02/22 134/81   08/05/21 129/82       NPO Status:                                                                                 BMI:   Wt Readings from Last 3 Encounters:   09/02/22 161 lb (73 kg)   08/05/21 171 lb 6.4 oz (77.7 kg)   03/25/21 172 lb 6.4 oz (78.2 kg)     There is no height or weight on file to calculate BMI.    CBC: No results found for: WBC, RBC, HGB, HCT, MCV, RDW, PLT    CMP:   Lab Results   Component Value Date/Time    CREATININE 0.9 10/04/2022 12:06 PM    GFRAA >60 10/04/2022 12:06 PM    LABGLOM >60 10/04/2022 12:06 PM       POC Tests: No results for input(s): POCGLU, POCNA, POCK, POCCL, POCBUN, POCHEMO, POCHCT in the last 72 hours.     Coags: No results found for: PROTIME, INR, APTT    HCG (If Applicable): No results found for: PREGTESTUR, PREGSERUM, HCG, HCGQUANT     ABGs: No results found for: PHART, PO2ART, NRC4WTV, NUW2CLW, BEART, B0BIRDWP     Type & Screen (If Applicable):  No results found for: LABABO, LABRH    Drug/Infectious Status (If Applicable):  No results found for: HIV, HEPCAB    COVID-19 Screening (If Applicable):   Lab Results   Component Value Date/Time    COVID19 Not Detected 03/20/2021 01:32 PM           Anesthesia Evaluation  Patient summary reviewed and Nursing notes reviewed no history of anesthetic complications:   Airway: Mallampati: II  TM distance: >3 FB   Neck ROM: full  Mouth opening: > = 3 FB   Dental: normal exam         Pulmonary:Negative Pulmonary ROS breath sounds clear to auscultation      (-) asthma and not a current smoker                          ROS comment: Wears 2.5 L oxygen at night  States her sats drop and she has had initial appt with pulm   Cardiovascular:    (+) hypertension:,     (-) past MI, CAD, CABG/stent, dysrhythmias,  angina and  CHF      Rhythm: regular  Rate: normal           Beta Blocker:  Dose within 24 Hrs        PE comment: Low hr , took carvedilol this am   Neuro/Psych:   (+) neuromuscular disease (fibromyalgia):, depression/anxiety    (-) seizures and TIA           GI/Hepatic/Renal:   (+) GERD:,      (-) liver disease and no renal disease       Endo/Other:    (+) hypothyroidism::., .                 Abdominal:             Vascular: negative vascular ROS. - DVT and PE. Other Findings:             Anesthesia Plan      MAC     ASA 2       Induction: intravenous. Anesthetic plan and risks discussed with patient. Plan discussed with CRNA.     Attending anesthesiologist reviewed and agrees with Preprocedure content                Michael Keen DO   1/26/2023

## 2024-06-28 NOTE — PROGRESS NOTES
ENDOSCOPY PREOP INSTRUCTIONS      Please at arrival time given to you from your doctor's office.  Report to the MAIN entrance on [address] and register at the surgery center on the left-hand side of the lobby  You will need your insurance card and photo id and a list of all medications taken on a regular basis. Please include the dose/frequency.    For your procedure:     PLEASE FOLLOW ALL INSTRUCTIONS & PREPS GIVEN TO YOU BY YOUR DOCTOR'S OFFICE.    If you have not received these instructions yet, please call the office immediately. Make sure to read them as soon as received.   If you are taking blood thinners, Aspirin or diabetic medication, make sure to call your doctor as soon as possible for instructions prior to your procedure.  Please dress comfortably and do not wear any lotion, powders or jewelry  If you use oxygen at home, please bring your oxygen tank with you to hospital.  Arrange for someone to be with you and sign you out & drive you home after your procedure.  THIS PERSON MUST WAIT AT HOSPITAL THE ENTIRE TIME.  We allow 2 adult visitors with you in the hospital & masks are strongly recommended.    WOMEN ONLY OF CHILDBEARING AGE: Please make sure to be able to give a urine sample on arrival      If you have further questions, you may contact your Endoscopist's office or Pre Admission Testing staff at [phone]

## 2024-06-30 ENCOUNTER — ANESTHESIA EVENT (OUTPATIENT)
Dept: ENDOSCOPY | Age: 79
End: 2024-06-30
Payer: MEDICARE

## 2024-07-01 ENCOUNTER — ANESTHESIA (OUTPATIENT)
Dept: ENDOSCOPY | Age: 79
End: 2024-07-01
Payer: MEDICARE

## 2024-07-01 ENCOUNTER — HOSPITAL ENCOUNTER (OUTPATIENT)
Age: 79
Setting detail: OUTPATIENT SURGERY
Discharge: HOME | End: 2024-07-01
Attending: INTERNAL MEDICINE | Admitting: INTERNAL MEDICINE
Payer: MEDICARE

## 2024-07-01 VITALS
TEMPERATURE: 97.7 F | HEIGHT: 60 IN | SYSTOLIC BLOOD PRESSURE: 133 MMHG | BODY MASS INDEX: 29.45 KG/M2 | DIASTOLIC BLOOD PRESSURE: 91 MMHG | HEART RATE: 68 BPM | RESPIRATION RATE: 18 BRPM | WEIGHT: 150 LBS | OXYGEN SATURATION: 96 %

## 2024-07-01 DIAGNOSIS — K21.9 GASTROESOPHAGEAL REFLUX DISEASE WITHOUT ESOPHAGITIS: ICD-10-CM

## 2024-07-01 DIAGNOSIS — R13.14 PHARYNGOESOPHAGEAL DYSPHAGIA: Primary | ICD-10-CM

## 2024-07-01 PROCEDURE — 2709999900 HC NON-CHARGEABLE SUPPLY: Performed by: INTERNAL MEDICINE

## 2024-07-01 PROCEDURE — 88305 TISSUE EXAM BY PATHOLOGIST: CPT

## 2024-07-01 PROCEDURE — 3700000001 HC ADD 15 MINUTES (ANESTHESIA): Performed by: INTERNAL MEDICINE

## 2024-07-01 PROCEDURE — 2500000003 HC RX 250 WO HCPCS: Performed by: NURSE ANESTHETIST, CERTIFIED REGISTERED

## 2024-07-01 PROCEDURE — 2580000003 HC RX 258: Performed by: ANESTHESIOLOGY

## 2024-07-01 PROCEDURE — 7100000010 HC PHASE II RECOVERY - FIRST 15 MIN: Performed by: INTERNAL MEDICINE

## 2024-07-01 PROCEDURE — 6360000002 HC RX W HCPCS

## 2024-07-01 PROCEDURE — 3700000000 HC ANESTHESIA ATTENDED CARE: Performed by: INTERNAL MEDICINE

## 2024-07-01 PROCEDURE — 7100000011 HC PHASE II RECOVERY - ADDTL 15 MIN: Performed by: INTERNAL MEDICINE

## 2024-07-01 PROCEDURE — C1726 CATH, BAL DIL, NON-VASCULAR: HCPCS | Performed by: INTERNAL MEDICINE

## 2024-07-01 PROCEDURE — 2580000003 HC RX 258

## 2024-07-01 PROCEDURE — 3609017700 HC EGD DILATION GASTRIC/DUODENAL STRICTURE: Performed by: INTERNAL MEDICINE

## 2024-07-01 RX ORDER — SODIUM CHLORIDE, SODIUM LACTATE, POTASSIUM CHLORIDE, CALCIUM CHLORIDE 600; 310; 30; 20 MG/100ML; MG/100ML; MG/100ML; MG/100ML
INJECTION, SOLUTION INTRAVENOUS CONTINUOUS PRN
Status: DISCONTINUED | OUTPATIENT
Start: 2024-07-01 | End: 2024-07-01 | Stop reason: SDUPTHER

## 2024-07-01 RX ORDER — LIDOCAINE HYDROCHLORIDE 10 MG/ML
1 INJECTION, SOLUTION EPIDURAL; INFILTRATION; INTRACAUDAL; PERINEURAL
Status: DISCONTINUED | OUTPATIENT
Start: 2024-07-01 | End: 2024-07-01 | Stop reason: HOSPADM

## 2024-07-01 RX ORDER — SODIUM CHLORIDE, SODIUM LACTATE, POTASSIUM CHLORIDE, CALCIUM CHLORIDE 600; 310; 30; 20 MG/100ML; MG/100ML; MG/100ML; MG/100ML
INJECTION, SOLUTION INTRAVENOUS CONTINUOUS
Status: DISCONTINUED | OUTPATIENT
Start: 2024-07-01 | End: 2024-07-01 | Stop reason: HOSPADM

## 2024-07-01 RX ORDER — LIDOCAINE HYDROCHLORIDE 20 MG/ML
INJECTION, SOLUTION INFILTRATION; PERINEURAL PRN
Status: DISCONTINUED | OUTPATIENT
Start: 2024-07-01 | End: 2024-07-01 | Stop reason: SDUPTHER

## 2024-07-01 RX ORDER — FAMOTIDINE 40 MG/1
40 TABLET, FILM COATED ORAL EVERY EVENING
Qty: 30 TABLET | Refills: 3 | Status: SHIPPED | OUTPATIENT
Start: 2024-07-01

## 2024-07-01 RX ORDER — PROPOFOL 10 MG/ML
INJECTION, EMULSION INTRAVENOUS CONTINUOUS PRN
Status: DISCONTINUED | OUTPATIENT
Start: 2024-07-01 | End: 2024-07-01 | Stop reason: SDUPTHER

## 2024-07-01 RX ADMIN — PROPOFOL 125 MCG/KG/MIN: 10 INJECTION, EMULSION INTRAVENOUS at 12:50

## 2024-07-01 RX ADMIN — SODIUM CHLORIDE, POTASSIUM CHLORIDE, SODIUM LACTATE AND CALCIUM CHLORIDE: 600; 310; 30; 20 INJECTION, SOLUTION INTRAVENOUS at 11:16

## 2024-07-01 RX ADMIN — LIDOCAINE HYDROCHLORIDE 100 MG: 20 INJECTION, SOLUTION INFILTRATION; PERINEURAL at 12:51

## 2024-07-01 RX ADMIN — SODIUM CHLORIDE, SODIUM LACTATE, POTASSIUM CHLORIDE, AND CALCIUM CHLORIDE: .6; .31; .03; .02 INJECTION, SOLUTION INTRAVENOUS at 12:39

## 2024-07-01 ASSESSMENT — LIFESTYLE VARIABLES: SMOKING_STATUS: 0

## 2024-07-01 ASSESSMENT — PAIN SCALES - GENERAL: PAINLEVEL_OUTOF10: 0

## 2024-07-01 NOTE — H&P
Pre-operative History and Physical    Patient: Marija Green  : 1945  Acct#:     History Obtained From:  patient    HISTORY OF PRESENT ILLNESS:    The patient is a 78 y.o. female who presents with  GERD, DYSPHAGIA    Past Medical History:        Diagnosis Date    Fibromyalgia syndrome     Hypertension     Neuropathy     On home O2     at night and prn     Past Surgical History:        Procedure Laterality Date    APPENDECTOMY      BREAST LUMPECTOMY Left     CATARACT REMOVAL N/A     COLONOSCOPY N/A 2023    COLONOSCOPY DIAGNOSTIC performed by Isra Moss MD at [facility] ENDOSCOPY    HYSTERECTOMY (CERVIX STATUS UNKNOWN)      UPPER GASTROINTESTINAL ENDOSCOPY N/A 2022    EGD BIOPSY performed by Tony Gregorio MD at [facility] ENDOSCOPY    UPPER GASTROINTESTINAL ENDOSCOPY N/A 2023    EGD DIAGNOSTIC ONLY performed by Isra Moss MD at [facility] ENDOSCOPY    VAGINA SURGERY       Medications Prior to Admission:   No current facility-administered medications on file prior to encounter.     Current Outpatient Medications on File Prior to Encounter   Medication Sig Dispense Refill    amLODIPine (NORVASC) 10 MG tablet Take 1 tablet by mouth daily      aspirin 81 MG EC tablet Take 1 tablet by mouth daily      HYDROcodone-acetaminophen (NORCO) 5-325 MG per tablet Take 1 tablet by mouth every 6 hours as needed for Pain.      OXYGEN Inhale 2.5 L into the lungs at bedtime And prn      ALPRAZolam (XANAX) 0.5 MG tablet TAKE 1 TABLET(0.5 MG) BY MOUTH THREE TIMES DAILY AS NEEDED FOR SLEEP      lansoprazole (PREVACID) 30 MG delayed release capsule Take 30 tablets by mouth 2 times daily      polyethylene glycol (GLYCOLAX) 17 g packet Take by mouth      gabapentin (NEURONTIN) 300 MG capsule Take 1 capsule by mouth 3 times daily.      levothyroxine (SYNTHROID) 75 MCG tablet Take 1 tablet by mouth Daily      carvedilol (COREG) 6.25 MG tablet Take 1 tablet by mouth 2 times daily (with meals)      losartan

## 2024-07-01 NOTE — DISCHARGE INSTRUCTIONS
can you learn more?  Go to https://www.[employer].net/patientEd and enter J454 to learn more about \"Upper GI Endoscopy: What to Expect at Home.\"  Current as of: October 19, 2023  Content Version: 14.1  © 4589-5546 [employer].   Care instructions adapted under license by [employer]. If you have questions about a medical condition or this instruction, always ask your healthcare professional. [employer] disclaims any warranty or liability for your use of this information.    ENDOSCOPY DISCHARGE INSTRUCTIONS:    Call the physician that did your procedure for any questions or concern:    [facility]: [phone]  DR. GERTRUDE COLUNGA        ACTIVITY:    There are potential side effects to the medications used for sedation and anesthesia during your procedure.  These include:  Dizziness or light-headedness, confusion or memory loss, delayed reaction times, loss of coordination, nausea and vomiting.  Because of your increased risk for injury, we ask that you observe the following precautions:  For the next 24 hours,  DO NOT operate an automobile, bicycle, motorcycle, , power tools or large equipment of any kind.  Do not drink alcohol, sign any legal documents or make any legal decisions for 24 hours.  Do not bend your head over lower than your heart.  DO sit on the side of bed/couch awhile before getting up.  Plan on bedrest or quiet relaxation today.  You may resume normal activities in 24 hours.    DIET:    Your first meal today should be light, avoiding spicy and fatty foods.  If you tolerate this first meal, then you may advance to your regular diet unless otherwise advised by your physician.    NORMAL SYMPTOMS:  -Mild sore throat if you’ve had an EGD   -Gaseous discomfort    NOTIFY YOUR PHYSICIAN IF THESE SYMPTOMS OCCUR:  1. Fever (greater than 100)  5. Increased abdominal bloating  2. Severe pain    6. Excessive bleeding  3. Nausea and vomiting  7. Chest pain

## 2024-07-01 NOTE — ANESTHESIA PRE PROCEDURE
Date of last solid food consumption: 06/30/24    BMI:   Wt Readings from Last 3 Encounters:   07/01/24 68 kg (150 lb)   09/02/22 73 kg (161 lb)   08/05/21 77.7 kg (171 lb 6.4 oz)     Body mass index is 29.29 kg/m².    CBC: No results found for: \"WBC\", \"RBC\", \"HGB\", \"HCT\", \"MCV\", \"RDW\", \"PLT\"    CMP:   Lab Results   Component Value Date/Time    CREATININE 0.9 10/04/2022 12:06 PM    GFRAA >60 10/04/2022 12:06 PM    LABGLOM >60 10/04/2022 12:06 PM       POC Tests: No results for input(s): \"POCGLU\", \"POCNA\", \"POCK\", \"POCCL\", \"POCBUN\", \"POCHEMO\", \"POCHCT\" in the last 72 hours.    Coags: No results found for: \"PROTIME\", \"INR\", \"APTT\"    HCG (If Applicable): No results found for: \"PREGTESTUR\", \"PREGSERUM\", \"HCG\", \"HCGQUANT\"     ABGs: No results found for: \"PHART\", \"PO2ART\", \"EXI8IQJ\", \"JTO8HPP\", \"BEART\", \"N9NTCZOR\"     Type & Screen (If Applicable):  No results found for: \"LABABO\"    Drug/Infectious Status (If Applicable):  No results found for: \"HIV\", \"HEPCAB\"    COVID-19 Screening (If Applicable):   Lab Results   Component Value Date/Time    COVID19 Not Detected 03/20/2021 01:32 PM           Anesthesia Evaluation  Patient summary reviewed and Nursing notes reviewed   no history of anesthetic complications:   Airway: Mallampati: II  TM distance: >3 FB   Neck ROM: full  Mouth opening: > = 3 FB   Dental: normal exam         Pulmonary:Negative Pulmonary ROS breath sounds clear to auscultation      (-) asthma and not a current smoker                          ROS comment: Wears 2.5 L oxygen at night  States her sats drop and she has had initial appt with pulm   Cardiovascular:    (+) hypertension:    (-) past MI, CAD, CABG/stent, dysrhythmias,  angina and  CHF      Rhythm: regular  Rate: normal           Beta Blocker:  Dose within 24 Hrs        PE comment: Low hr , took carvedilol this am   Neuro/Psych:   (+) neuromuscular disease (fibromyalgia):depression/anxiety    (-) seizures and TIA           GI/Hepatic/Renal:   (+)

## 2024-07-01 NOTE — ANESTHESIA POSTPROCEDURE EVALUATION
Department of Anesthesiology  Postprocedure Note    Patient: Marija Green  MRN: [id number]  YOB: 1945  Date of evaluation: 7/1/2024    Procedure Summary       Date: 07/01/24 Room / Location: [place] [address] / [facility]    Anesthesia Start: 1239 Anesthesia Stop: 1314    Procedure: ESOPHAGOGASTRODUODENOSCOPY DILATION BALLOON/ BIOPSY Diagnosis:       Gastroesophageal reflux disease without esophagitis      (Gastroesophageal reflux disease without esophagitis [K21.9])    Surgeons: Maite Marie MD Responsible Provider: Adolfo Kraus MD    Anesthesia Type: MAC ASA Status: 2            Anesthesia Type: No value filed.    Thompson Phase I: Thompson Score: 10    Thompson Phase II: Thompson Score: 10    Anesthesia Post Evaluation    Patient location during evaluation: PACU  Patient participation: complete - patient participated  Level of consciousness: awake and alert  Airway patency: patent  Nausea & Vomiting: no nausea and no vomiting  Cardiovascular status: hemodynamically stable  Respiratory status: acceptable  Hydration status: euvolemic  Multimodal analgesia pain management approach  Pain management: satisfactory to patient    No notable events documented.

## 2024-07-01 NOTE — PROCEDURES
PROCEDURE NOTE  Date: 2024   Name: Marija Green  YOB: 1945    Procedures  EGD, BIOPSY, BALLOON DILATION                Endoscopy Note    Patient: Marija Green  : 1945  Acct#:     Procedure: Esophagogastroduodenoscopy with biopsy, esophageal Balloon dilation    Date:  2024     Surgeon:  Maite Marie MD,     Referring Physician:  Jama Mcintosh III, MD      Preoperative Diagnosis:    78-year-old female with history of chronic reflux with worsening symptoms and symptoms of dysphagia is here for EGD exam and possible dilation      Anesthesia: MAC anesthesia      Consent:  The patient or their legal guardian has signed an informed consent, and is aware of the potential risks, benefits, alternatives, and potential complications of this procedure.  These include, but are not limited to hemorrhage, bleeding, post procedural pain, perforation, phlebitis, aspiration, hypotension, hypoxia, cardiovascular events such as arryhthmia, and possibly death.     Description of Procedure: The patient was then taken to the endoscopy suite, placed in the left lateral decubitus position and the above IV sedation was administrered.  The Olympus video endoscope was placed through the patient's oropharynx without difficulty to the extent of the 2nd portion of the duodenum. The patient tolerated the procedure well and was taken to the post anesthesia care unit in good condition.    Complications: None  EBL: none      Findings:  Esophagus:  Visualization of the esophagus demonstrated normal mucosa in the upper and mid esophagus.  Mild esophageal dysmotility seen.  Grade a reflux esophagitis seen.  GE junction stricture, TTS balloon dilation performed with 15 and 16.5 mm balloon.  Small mucosal tear seen post 16.5 mm balloon dilation.  5 cm hiatal hernia seen.  Stomach:  The scope was then advanced into the stomach.  Both forward and retroflexed views of the stomach were obtained.  Visualization

## 2024-07-01 NOTE — PROGRESS NOTES
Ambulatory Surgery/Procedure Discharge Note    Vitals:    07/01/24 1317   BP: 121/81   Pulse: 73   Resp: 16   Temp: 97.7 °F (36.5 °C)   SpO2: 96%         Phase 2 post. Endo awake alert loc x 4 see flow sheet     In: 600 [I.V.:600]  Out: -     Restroom use offered before discharge.  Yes    Pain assessment:  none  Pain Level: 0/10    1323 reviewed d/c instructions with  son and pt both verbalized their understanding  1344 waiting on DrGina To see pt and son; d/c iv.  Up at bedside getting dressed.  1350 DrGina Here to see pt and son. Made them aware of hr diet restrictions and no food 4 hrs before bedtime and   Son aware to schedule BE swallow  # given to him on d/c paper work  1400  Patient discharged to home/self care. Patient discharged via wheel chair by transporter to waiting family/S.O.       7/1/2024

## 2024-07-10 ENCOUNTER — HOSPITAL ENCOUNTER (OUTPATIENT)
Dept: GENERAL RADIOLOGY | Age: 79
Discharge: HOME | End: 2024-07-10
Attending: INTERNAL MEDICINE
Payer: MEDICARE

## 2024-07-10 DIAGNOSIS — R13.14 PHARYNGOESOPHAGEAL DYSPHAGIA: ICD-10-CM

## 2024-07-10 DIAGNOSIS — K21.9 GASTROESOPHAGEAL REFLUX DISEASE WITHOUT ESOPHAGITIS: ICD-10-CM

## 2024-07-10 PROCEDURE — 74230 X-RAY XM SWLNG FUNCJ C+: CPT

## 2024-07-10 PROCEDURE — 74220 X-RAY XM ESOPHAGUS 1CNTRST: CPT

## 2024-07-10 PROCEDURE — 92611 MOTION FLUOROSCOPY/SWALLOW: CPT

## 2024-07-10 NOTE — PROCEDURES
INSTRUMENTAL SWALLOW REPORT  Out pt MODIFIED BARIUM SWALLOW/C    NAME: Marija Green     : 1945  MRN: [id number]       Date of Eval: 7/10/2024     Ordering Physician: Dr Marie  Referring Diagnosis: Dysphagia    Past Medical History:  has a past medical history of Fibromyalgia syndrome, Hypertension, Neuropathy, and On home O2.  Past Surgical History:  has a past surgical history that includes Vagina surgery; Breast lumpectomy (Left); Hysterectomy; Appendectomy; Cataract removal (N/A); Upper gastrointestinal endoscopy (N/A, 2022); Colonoscopy (N/A, 2023); Upper gastrointestinal endoscopy (N/A, 2023); and Upper gastrointestinal endoscopy (N/A, 2024).    CXR:   No recent imaging    Prior MBS Results: none    EGD results 24  LA class grade A esophagitis   Mild GE junction stricture, TTS balloon dilation performed with 15 and 16.5 mm balloon, small mucosal tear seen post 16.5 mm balloon dilation  5 cm hiatal hernia  Mild antral gastritis  Few small gastric polyps most likely fundic gland polyps, biopsies obtained     Patient Complaints/Reason for Referral:  Marija Green was referred for a MBS to assess the efficiency of his/her swallow function, assess for aspiration, and to make recommendations regarding safe dietary consistencies, effective compensatory strategies, and safe eating environment.    Onset of problem:   Few months    Behavior/Cognition: pleasant and cooperative  Vision/Hearing: appears functional for tasks presented      Impressions:  Oral Phase  WFL  Pharyngeal Phase  WFL- episodes of transient penetration of thin liquids that clears spontaneously noted.  This is a normal finding of pt of advanced age. No aspiration or pharyngeal residue were identified.  Upper Esophageal Screen  Clears UES adequately, no backflow noted    Treatment Dx and ICD 10: dysphagia  Position: Lateral and upright  Consistencies administered: puree, thin and solids    Penetration Aspiration Scale

## 2024-09-26 ENCOUNTER — ANESTHESIA (OUTPATIENT)
Dept: ENDOSCOPY | Age: 79
End: 2024-09-26
Payer: MEDICARE

## 2024-09-26 ENCOUNTER — ANESTHESIA EVENT (OUTPATIENT)
Dept: ENDOSCOPY | Age: 79
End: 2024-09-26
Payer: MEDICARE

## 2024-09-26 ENCOUNTER — HOSPITAL ENCOUNTER (OUTPATIENT)
Age: 79
Setting detail: OUTPATIENT SURGERY
Discharge: HOME | End: 2024-09-26
Attending: INTERNAL MEDICINE | Admitting: INTERNAL MEDICINE
Payer: MEDICARE

## 2024-09-26 VITALS
DIASTOLIC BLOOD PRESSURE: 88 MMHG | OXYGEN SATURATION: 97 % | TEMPERATURE: 96.9 F | SYSTOLIC BLOOD PRESSURE: 116 MMHG | RESPIRATION RATE: 16 BRPM | HEIGHT: 60 IN | WEIGHT: 150 LBS | BODY MASS INDEX: 29.45 KG/M2 | HEART RATE: 70 BPM

## 2024-09-26 PROCEDURE — 6360000002 HC RX W HCPCS

## 2024-09-26 PROCEDURE — 2709999900 HC NON-CHARGEABLE SUPPLY: Performed by: INTERNAL MEDICINE

## 2024-09-26 PROCEDURE — 2580000003 HC RX 258

## 2024-09-26 PROCEDURE — 7100000011 HC PHASE II RECOVERY - ADDTL 15 MIN: Performed by: INTERNAL MEDICINE

## 2024-09-26 PROCEDURE — 3700000000 HC ANESTHESIA ATTENDED CARE: Performed by: INTERNAL MEDICINE

## 2024-09-26 PROCEDURE — 3700000001 HC ADD 15 MINUTES (ANESTHESIA): Performed by: INTERNAL MEDICINE

## 2024-09-26 PROCEDURE — 3609017700 HC EGD DILATION GASTRIC/DUODENAL STRICTURE: Performed by: INTERNAL MEDICINE

## 2024-09-26 PROCEDURE — 7100000010 HC PHASE II RECOVERY - FIRST 15 MIN: Performed by: INTERNAL MEDICINE

## 2024-09-26 PROCEDURE — 2500000003 HC RX 250 WO HCPCS

## 2024-09-26 PROCEDURE — C1726 CATH, BAL DIL, NON-VASCULAR: HCPCS | Performed by: INTERNAL MEDICINE

## 2024-09-26 RX ORDER — LIDOCAINE HYDROCHLORIDE 20 MG/ML
INJECTION, SOLUTION INFILTRATION; PERINEURAL
Status: DISCONTINUED | OUTPATIENT
Start: 2024-09-26 | End: 2024-09-26 | Stop reason: SDUPTHER

## 2024-09-26 RX ORDER — PROPOFOL 10 MG/ML
INJECTION, EMULSION INTRAVENOUS
Status: DISCONTINUED | OUTPATIENT
Start: 2024-09-26 | End: 2024-09-26 | Stop reason: SDUPTHER

## 2024-09-26 RX ORDER — SODIUM CHLORIDE, SODIUM LACTATE, POTASSIUM CHLORIDE, CALCIUM CHLORIDE 600; 310; 30; 20 MG/100ML; MG/100ML; MG/100ML; MG/100ML
INJECTION, SOLUTION INTRAVENOUS
Status: DISCONTINUED | OUTPATIENT
Start: 2024-09-26 | End: 2024-09-26 | Stop reason: SDUPTHER

## 2024-09-26 RX ORDER — HYDROCODONE BITARTRATE AND ACETAMINOPHEN 5; 325 MG/1; MG/1
1 TABLET ORAL
Status: DISCONTINUED | OUTPATIENT
Start: 2024-09-26 | End: 2024-09-26 | Stop reason: HOSPADM

## 2024-09-26 RX ORDER — SODIUM CHLORIDE, SODIUM LACTATE, POTASSIUM CHLORIDE, CALCIUM CHLORIDE 600; 310; 30; 20 MG/100ML; MG/100ML; MG/100ML; MG/100ML
INJECTION, SOLUTION INTRAVENOUS CONTINUOUS
Status: DISCONTINUED | OUTPATIENT
Start: 2024-09-26 | End: 2024-09-26 | Stop reason: HOSPADM

## 2024-09-26 RX ADMIN — SODIUM CHLORIDE, SODIUM LACTATE, POTASSIUM CHLORIDE, AND CALCIUM CHLORIDE: .6; .31; .03; .02 INJECTION, SOLUTION INTRAVENOUS at 13:34

## 2024-09-26 RX ADMIN — PROPOFOL 50 MG: 10 INJECTION, EMULSION INTRAVENOUS at 13:45

## 2024-09-26 RX ADMIN — PROPOFOL 100 MG: 10 INJECTION, EMULSION INTRAVENOUS at 13:40

## 2024-09-26 RX ADMIN — LIDOCAINE HYDROCHLORIDE 100 MG: 20 INJECTION, SOLUTION INFILTRATION; PERINEURAL at 13:40

## 2024-09-26 ASSESSMENT — PAIN - FUNCTIONAL ASSESSMENT: PAIN_FUNCTIONAL_ASSESSMENT: 0-10

## 2024-09-26 NOTE — PROCEDURES
PROCEDURE NOTE  Date: 2024   Name: Marija Green  YOB: 1945    Procedures  EGD/ BALLOON DILATION                  Endoscopy Note    Patient: Marija Green  : 1945  Acct#:     Procedure: Esophagogastroduodenoscopy with esophageal balloon dilation    Date:  2024     Surgeon:  Maite Marie MD,     Referring Physician:  Jama Mcintosh III, MD      Preoperative Diagnosis:    78-year-old female with history of chronic reflux and esophageal dysphagia.  Patient had EGD with dilation done few months back.  Symptoms have improved but she is here for a follow-up reevaluation and possible dilation      Anesthesia: MAC anesthesia      Consent:  The patient or their legal guardian has signed an informed consent, and is aware of the potential risks, benefits, alternatives, and potential complications of this procedure.  These include, but are not limited to hemorrhage, bleeding, post procedural pain, perforation, phlebitis, aspiration, hypotension, hypoxia, cardiovascular events such as arryhthmia, and possibly death.     Description of Procedure: The patient was then taken to the endoscopy suite, placed in the left lateral decubitus position and the above IV sedation was administrered.  The Olympus video endoscope was placed through the patient's oropharynx without difficulty to the extent of the 2nd portion of the duodenum. The patient tolerated the procedure well and was taken to the post anesthesia care unit in good condition.    Complications: None  EBL: none      Findings:  Esophagus:  Visualization of the esophagus demonstrated normal esophageal mucosa.  Previously seen esophagitis has healed.  Findings of mild presbyesophagus seen.  GE junction at 32 cm.  5 cm hiatal hernia seen.  TTS balloon dilation performed with 15 to 18 mL of balloon, no heme seen post dilation.     Stomach:  The scope was then advanced into the stomach.  Both forward and retroflexed views of the

## 2024-09-26 NOTE — PROGRESS NOTES
Ambulatory Surgery/Procedure Discharge Note    Vitals:    09/26/24 1414   BP: 116/88   Pulse: 70   Resp: 16   Temp:    SpO2: 97%     Patient meets criteria for discharge per andrew score.   In: 400 [I.V.:400]  Out: -     Restroom use offered before discharge.  Yes    Pain assessment:  none  Pain Level: 0    Pt and S.O./family states \"ready to go home\". Pt alert and oriented x4. IV removed. Denies N/V or pain. Pt tolerating po intake. Discharge instructions given to pt and sonAlonzo  with pt permission. Pt and family  verbalized understanding of all instructions. Left with all belongings,  prescriptions, and discharge instructions.     Patient discharged to home/self care. Patient discharged via wheel chair by transporter to waiting family/S.O.       9/26/2024 2:26 PM

## 2024-09-26 NOTE — DISCHARGE INSTRUCTIONS
ENDOSCOPY DISCHARGE INSTRUCTIONS:    Call the physician that did your procedure for any questions or concern:    [facility]: [phone]  DR. GERTRUDE COLUNGA        ACTIVITY:    There are potential side effects to the medications used for sedation and anesthesia during your procedure.  These include:  Dizziness or light-headedness, confusion or memory loss, delayed reaction times, loss of coordination, nausea and vomiting.  Because of your increased risk for injury, we ask that you observe the following precautions:  For the next 24 hours,  DO NOT operate an automobile, bicycle, motorcycle, , power tools or large equipment of any kind.  Do not drink alcohol, sign any legal documents or make any legal decisions for 24 hours.  Do not bend your head over lower than your heart.  DO sit on the side of bed/couch awhile before getting up.  Plan on bedrest or quiet relaxation today.  You may resume normal activities in 24 hours.    DIET:    Your first meal today should be light, avoiding spicy and fatty foods.  If you tolerate this first meal, then you may advance to your regular diet unless otherwise advised by your physician.    NORMAL SYMPTOMS:  -Mild sore throat if you’ve had an EGD   -Gaseous discomfort    NOTIFY YOUR PHYSICIAN IF THESE SYMPTOMS OCCUR:  1. Fever (greater than 100)  5. Increased abdominal bloating  2. Severe pain    6. Excessive bleeding  3. Nausea and vomiting  7. Chest pain                                                                    4. Chills    8. Shortness of breath    ADDITIONAL INSTRUCTIONS:    Biopsy results: Call [facility] for biopsy results in 1 week    Recommendations:   Follow-up pathology results  Continue PPI as directed  Strict diet and lifestyle instructions for GERD as discussed  Follow-up in the office in 6 weeks          Please review these discharge instructions this evening or tomorrow for  information you may have forgotten.            We want to thank you

## 2024-09-26 NOTE — H&P
Pre-operative History and Physical    Patient: Marija Green  : 1945  Acct#:     History Obtained From:  patient    HISTORY OF PRESENT ILLNESS:    The patient is a 78 y.o. female who presents with  GERD, dysphagia    Past Medical History:        Diagnosis Date    Fibromyalgia syndrome     Hypertension     Neuropathy     On home O2     at night and prn     Past Surgical History:        Procedure Laterality Date    APPENDECTOMY      BREAST LUMPECTOMY Left     CATARACT REMOVAL N/A     COLONOSCOPY N/A 2023    COLONOSCOPY DIAGNOSTIC performed by Isra Moss MD at [facility] ENDOSCOPY    HYSTERECTOMY (CERVIX STATUS UNKNOWN)      UPPER GASTROINTESTINAL ENDOSCOPY N/A 2022    EGD BIOPSY performed by Tony Gregorio MD at [facility] ENDOSCOPY    UPPER GASTROINTESTINAL ENDOSCOPY N/A 2023    EGD DIAGNOSTIC ONLY performed by Isra Moss MD at [facility] ENDOSCOPY    UPPER GASTROINTESTINAL ENDOSCOPY N/A 2024    ESOPHAGOGASTRODUODENOSCOPY DILATION BALLOON/ BIOPSY performed by Maite Marie MD at [facility] ENDOSCOPY    VAGINA SURGERY       Medications Prior to Admission:   No current facility-administered medications on file prior to encounter.     Current Outpatient Medications on File Prior to Encounter   Medication Sig Dispense Refill    famotidine (PEPCID) 40 MG tablet Take 1 tablet by mouth every evening 30 tablet 3    amLODIPine (NORVASC) 10 MG tablet Take 1 tablet by mouth daily      HYDROcodone-acetaminophen (NORCO) 5-325 MG per tablet Take 1 tablet by mouth every 6 hours as needed for Pain.      lansoprazole (PREVACID) 30 MG delayed release capsule Take 30 tablets by mouth 2 times daily      gabapentin (NEURONTIN) 300 MG capsule Take 1 capsule by mouth 3 times daily.      levothyroxine (SYNTHROID) 75 MCG tablet Take 1 tablet by mouth Daily      carvedilol (COREG) 6.25 MG tablet Take 1 tablet by mouth 2 times daily (with meals)      aspirin 81 MG EC tablet Take 1 tablet by mouth daily

## (undated) DEVICE — ESOPHAGEAL BALLOON DILATATION CATHETER: Brand: CRE FIXED WIRE

## (undated) DEVICE — CANNULA SAMP CO2 AD GRN 7FT CO2 AND 7FT O2 TBNG UNIV CONN

## (undated) DEVICE — SYRINGE INFL 60ML DISP ALLIANCE II

## (undated) DEVICE — FORCEPS BX L240CM WRK CHN 2.8MM STD CAP W/ NDL MIC MESH

## (undated) DEVICE — FORCEPS BX L240CM JAW DIA2.4MM ORNG L CAP W/ NDL DISP RAD